# Patient Record
Sex: FEMALE | Race: WHITE | HISPANIC OR LATINO
[De-identification: names, ages, dates, MRNs, and addresses within clinical notes are randomized per-mention and may not be internally consistent; named-entity substitution may affect disease eponyms.]

---

## 2018-02-02 ENCOUNTER — LAB SERVICES (OUTPATIENT)
Dept: OTHER | Age: 23
End: 2018-02-02

## 2018-02-02 ENCOUNTER — IMAGING SERVICES (OUTPATIENT)
Dept: OTHER | Age: 23
End: 2018-02-02

## 2018-02-02 ENCOUNTER — CHARTING TRANS (OUTPATIENT)
Dept: OTHER | Age: 23
End: 2018-02-02

## 2018-02-02 LAB
B-HCG SERPL-ACNC: <2 M[IU]/ML (ref 0–6)
EST. AVERAGE GLUCOSE BLD GHB EST-MCNC: 103 MG/DL (ref 0–154)
ESTRADIOL SERPL-MCNC: 37.27 PG/ML
FOLLICLE STIMULATING: 4.02 M[IU]/ML (ref 2–25)
HBA1C MFR BLD: 5.2 % (ref 4.2–6)
LH SERPL-ACNC: 9.51 M[IU]/ML
PROGESTERONE: 2.23 NG/ML
PROLACTIN SERPL-MCNC: 13.5 NG/ML (ref 3–19)
TSH SERPL DL<=0.05 MIU/L-ACNC: 2.06 M[IU]/L (ref 0.3–4.82)

## 2018-02-05 LAB — DHEA-S SERPL-MCNC: 302.4 MCG/DL (ref 8–391)

## 2018-02-20 ENCOUNTER — LAB SERVICES (OUTPATIENT)
Dept: OTHER | Age: 23
End: 2018-02-20

## 2018-02-20 ENCOUNTER — CHARTING TRANS (OUTPATIENT)
Dept: OTHER | Age: 23
End: 2018-02-20

## 2018-02-20 LAB — PREGNANCY TEST, URINE: NEGATIVE

## 2018-02-21 LAB
C TRACH DNA SPEC QL NAA+PROBE: NEGATIVE
N GONORRHOEA DNA SPEC QL NAA+PROBE: POSITIVE

## 2018-03-09 ENCOUNTER — CHARTING TRANS (OUTPATIENT)
Dept: OTHER | Age: 23
End: 2018-03-09

## 2018-07-23 ENCOUNTER — LAB SERVICES (OUTPATIENT)
Dept: OTHER | Age: 23
End: 2018-07-23

## 2018-07-23 ENCOUNTER — CHARTING TRANS (OUTPATIENT)
Dept: OTHER | Age: 23
End: 2018-07-23

## 2018-07-23 LAB — PREGNANCY TEST, URINE: NEGATIVE

## 2018-07-24 LAB
C TRACH DNA SPEC QL NAA+PROBE: NEGATIVE
N GONORRHOEA DNA SPEC QL NAA+PROBE: NEGATIVE

## 2019-03-05 VITALS
DIASTOLIC BLOOD PRESSURE: 70 MMHG | SYSTOLIC BLOOD PRESSURE: 122 MMHG | WEIGHT: 197 LBS | BODY MASS INDEX: 31.66 KG/M2 | HEIGHT: 66 IN

## 2019-03-06 VITALS
SYSTOLIC BLOOD PRESSURE: 120 MMHG | HEIGHT: 66 IN | BODY MASS INDEX: 33.91 KG/M2 | DIASTOLIC BLOOD PRESSURE: 80 MMHG | WEIGHT: 211 LBS

## 2019-03-06 VITALS
DIASTOLIC BLOOD PRESSURE: 78 MMHG | BODY MASS INDEX: 33.59 KG/M2 | WEIGHT: 209 LBS | HEIGHT: 66 IN | SYSTOLIC BLOOD PRESSURE: 120 MMHG

## 2019-10-02 ENCOUNTER — APPOINTMENT (OUTPATIENT)
Dept: OBGYN | Age: 24
End: 2019-10-02

## 2019-10-03 ENCOUNTER — APPOINTMENT (OUTPATIENT)
Dept: OBGYN | Age: 24
End: 2019-10-03

## 2019-10-03 ENCOUNTER — OFFICE VISIT (OUTPATIENT)
Dept: OBGYN | Age: 24
End: 2019-10-03

## 2019-10-03 VITALS
HEIGHT: 66 IN | BODY MASS INDEX: 29.61 KG/M2 | WEIGHT: 184.25 LBS | DIASTOLIC BLOOD PRESSURE: 80 MMHG | SYSTOLIC BLOOD PRESSURE: 120 MMHG

## 2019-10-03 DIAGNOSIS — Z31.9 INFERTILITY MANAGEMENT: ICD-10-CM

## 2019-10-03 DIAGNOSIS — Z01.411 ENCOUNTER FOR GYNECOLOGICAL EXAMINATION WITH ABNORMAL FINDING: ICD-10-CM

## 2019-10-03 DIAGNOSIS — N89.8 VAGINAL DISCHARGE: ICD-10-CM

## 2019-10-03 DIAGNOSIS — Z11.3 ROUTINE SCREENING FOR STI (SEXUALLY TRANSMITTED INFECTION): ICD-10-CM

## 2019-10-03 DIAGNOSIS — R10.2 PELVIC CRAMPING: ICD-10-CM

## 2019-10-03 DIAGNOSIS — N92.6 IRREGULAR PERIODS: Primary | ICD-10-CM

## 2019-10-03 DIAGNOSIS — Z11.8 SCREENING FOR CHLAMYDIAL DISEASE: ICD-10-CM

## 2019-10-03 LAB
25(OH)D3 SERPL-MCNC: ABNORMAL NG/ML
B-HCG UR QL: NEGATIVE
CLUE CELLS: PRESENT
TRICHOMONAS ANTIGEN: POSITIVE
TRICHOMONAS: ABNORMAL
WP WBC: ABNORMAL

## 2019-10-03 PROCEDURE — 87491 CHLMYD TRACH DNA AMP PROBE: CPT | Performed by: OBSTETRICS & GYNECOLOGY

## 2019-10-03 PROCEDURE — 87591 N.GONORRHOEAE DNA AMP PROB: CPT | Performed by: OBSTETRICS & GYNECOLOGY

## 2019-10-03 PROCEDURE — 87210 SMEAR WET MOUNT SALINE/INK: CPT | Performed by: PATHOLOGY

## 2019-10-03 PROCEDURE — 88142 CYTOPATH C/V THIN LAYER: CPT | Performed by: PATHOLOGY

## 2019-10-03 PROCEDURE — 99395 PREV VISIT EST AGE 18-39: CPT | Performed by: OBSTETRICS & GYNECOLOGY

## 2019-10-03 PROCEDURE — 81025 URINE PREGNANCY TEST: CPT | Performed by: OBSTETRICS & GYNECOLOGY

## 2019-10-03 PROCEDURE — 99214 OFFICE O/P EST MOD 30 MIN: CPT | Performed by: OBSTETRICS & GYNECOLOGY

## 2019-10-04 LAB
C TRACH DNA SPEC QL NAA+PROBE: NEGATIVE
N GONORRHOEA DNA SPEC QL NAA+PROBE: NEGATIVE

## 2019-10-08 LAB — AP REPORT: NORMAL

## 2019-10-23 ENCOUNTER — TELEPHONE (OUTPATIENT)
Dept: OBGYN | Age: 24
End: 2019-10-23

## 2019-10-23 RX ORDER — METRONIDAZOLE 7.5 MG/G
1 GEL VAGINAL NIGHTLY
Qty: 70 G | Refills: 0 | Status: SHIPPED | OUTPATIENT
Start: 2019-10-23 | End: 2023-01-23

## 2019-10-23 RX ORDER — METRONIDAZOLE 500 MG/1
TABLET ORAL
Qty: 4 TABLET | Refills: 0 | Status: SHIPPED | OUTPATIENT
Start: 2019-10-23 | End: 2023-01-23

## 2019-11-01 ENCOUNTER — APPOINTMENT (OUTPATIENT)
Dept: ULTRASOUND IMAGING | Age: 24
End: 2019-11-01
Attending: OBSTETRICS & GYNECOLOGY

## 2023-01-23 ENCOUNTER — OFFICE VISIT (OUTPATIENT)
Dept: INTERNAL MEDICINE | Age: 28
End: 2023-01-23

## 2023-01-23 ENCOUNTER — LAB SERVICES (OUTPATIENT)
Dept: LAB | Age: 28
End: 2023-01-23

## 2023-01-23 VITALS
HEIGHT: 66 IN | HEART RATE: 102 BPM | RESPIRATION RATE: 18 BRPM | DIASTOLIC BLOOD PRESSURE: 68 MMHG | WEIGHT: 200 LBS | BODY MASS INDEX: 32.14 KG/M2 | OXYGEN SATURATION: 98 % | SYSTOLIC BLOOD PRESSURE: 110 MMHG | TEMPERATURE: 97.8 F

## 2023-01-23 DIAGNOSIS — Z00.00 ANNUAL PHYSICAL EXAM: Primary | ICD-10-CM

## 2023-01-23 DIAGNOSIS — M54.50 LUMBAR PAIN: ICD-10-CM

## 2023-01-23 DIAGNOSIS — N92.6 IRREGULAR PERIODS: ICD-10-CM

## 2023-01-23 PROBLEM — G89.29 CHRONIC BILATERAL LOW BACK PAIN WITHOUT SCIATICA: Status: ACTIVE | Noted: 2023-01-23

## 2023-01-23 PROBLEM — G89.29 CHRONIC BILATERAL LOW BACK PAIN WITHOUT SCIATICA: Status: RESOLVED | Noted: 2023-01-23 | Resolved: 2023-01-23

## 2023-01-23 LAB — B-HCG UR QL: NEGATIVE

## 2023-01-23 PROCEDURE — 99203 OFFICE O/P NEW LOW 30 MIN: CPT | Performed by: INTERNAL MEDICINE

## 2023-01-23 PROCEDURE — 81025 URINE PREGNANCY TEST: CPT | Performed by: INTERNAL MEDICINE

## 2023-01-23 PROCEDURE — 99385 PREV VISIT NEW AGE 18-39: CPT | Performed by: INTERNAL MEDICINE

## 2023-01-23 ASSESSMENT — PAIN SCALES - GENERAL: PAINLEVEL: 0

## 2023-01-23 ASSESSMENT — PATIENT HEALTH QUESTIONNAIRE - PHQ9
SUM OF ALL RESPONSES TO PHQ9 QUESTIONS 1 AND 2: 0
SUM OF ALL RESPONSES TO PHQ9 QUESTIONS 1 AND 2: 0
2. FEELING DOWN, DEPRESSED OR HOPELESS: NOT AT ALL
CLINICAL INTERPRETATION OF PHQ2 SCORE: NO FURTHER SCREENING NEEDED
1. LITTLE INTEREST OR PLEASURE IN DOING THINGS: NOT AT ALL

## 2023-02-04 ENCOUNTER — OFFICE VISIT (OUTPATIENT)
Dept: OBGYN | Age: 28
End: 2023-02-04
Attending: INTERNAL MEDICINE

## 2023-02-04 VITALS
OXYGEN SATURATION: 98 % | WEIGHT: 203.7 LBS | BODY MASS INDEX: 32.88 KG/M2 | HEART RATE: 73 BPM | RESPIRATION RATE: 18 BRPM | DIASTOLIC BLOOD PRESSURE: 66 MMHG | SYSTOLIC BLOOD PRESSURE: 104 MMHG | TEMPERATURE: 97.5 F

## 2023-02-04 DIAGNOSIS — N92.6 IRREGULAR MENSTRUAL CYCLE: Primary | ICD-10-CM

## 2023-02-04 DIAGNOSIS — Z31.69 ENCOUNTER FOR PRECONCEPTION CONSULTATION: ICD-10-CM

## 2023-02-04 PROCEDURE — 99203 OFFICE O/P NEW LOW 30 MIN: CPT | Performed by: NURSE PRACTITIONER

## 2023-08-21 ENCOUNTER — LAB SERVICES (OUTPATIENT)
Dept: LAB | Age: 28
End: 2023-08-21

## 2023-08-21 ENCOUNTER — TELEPHONE (OUTPATIENT)
Dept: OBGYN | Age: 28
End: 2023-08-21

## 2023-08-21 DIAGNOSIS — O20.9 BLEEDING IN EARLY PREGNANCY: ICD-10-CM

## 2023-08-21 DIAGNOSIS — O20.9 BLEEDING IN EARLY PREGNANCY: Primary | ICD-10-CM

## 2023-08-21 LAB — HCG SERPL-ACNC: 369 MUNITS/ML

## 2023-08-21 PROCEDURE — 36415 COLL VENOUS BLD VENIPUNCTURE: CPT | Performed by: NURSE PRACTITIONER

## 2023-08-21 PROCEDURE — 86900 BLOOD TYPING SEROLOGIC ABO: CPT | Performed by: INTERNAL MEDICINE

## 2023-08-21 PROCEDURE — 86901 BLOOD TYPING SEROLOGIC RH(D): CPT | Performed by: INTERNAL MEDICINE

## 2023-08-21 PROCEDURE — 84702 CHORIONIC GONADOTROPIN TEST: CPT | Performed by: INTERNAL MEDICINE

## 2023-08-22 DIAGNOSIS — O20.9 BLEEDING IN EARLY PREGNANCY: Primary | ICD-10-CM

## 2023-08-22 LAB — ABO + RH BLD: NORMAL

## 2023-08-23 ENCOUNTER — LAB SERVICES (OUTPATIENT)
Dept: LAB | Age: 28
End: 2023-08-23

## 2023-08-23 DIAGNOSIS — O20.9 BLEEDING IN EARLY PREGNANCY: ICD-10-CM

## 2023-08-23 LAB — HCG SERPL-ACNC: 297 MUNITS/ML

## 2023-08-23 PROCEDURE — 84702 CHORIONIC GONADOTROPIN TEST: CPT | Performed by: INTERNAL MEDICINE

## 2023-08-23 PROCEDURE — 36415 COLL VENOUS BLD VENIPUNCTURE: CPT | Performed by: NURSE PRACTITIONER

## 2023-08-25 ENCOUNTER — LAB SERVICES (OUTPATIENT)
Dept: LAB | Age: 28
End: 2023-08-25

## 2023-08-25 DIAGNOSIS — O20.9 BLEEDING IN EARLY PREGNANCY: ICD-10-CM

## 2023-08-25 LAB — HCG SERPL-ACNC: 355 MUNITS/ML

## 2023-08-25 PROCEDURE — 36415 COLL VENOUS BLD VENIPUNCTURE: CPT | Performed by: NURSE PRACTITIONER

## 2023-08-25 PROCEDURE — 84702 CHORIONIC GONADOTROPIN TEST: CPT | Performed by: INTERNAL MEDICINE

## 2023-08-28 DIAGNOSIS — O02.81 INAPPROPRIATE CHANGE IN QUANTITATIVE HCG IN EARLY PREGNANCY: Primary | ICD-10-CM

## 2023-08-29 ENCOUNTER — TELEPHONE (OUTPATIENT)
Dept: OBGYN | Age: 28
End: 2023-08-29

## 2023-08-30 ENCOUNTER — HOSPITAL ENCOUNTER (EMERGENCY)
Age: 28
Discharge: HOME | End: 2023-08-30
Attending: EMERGENCY MEDICINE

## 2023-08-30 ENCOUNTER — APPOINTMENT (OUTPATIENT)
Dept: OBGYN | Age: 28
End: 2023-08-30

## 2023-08-30 ENCOUNTER — IMAGING SERVICES (OUTPATIENT)
Dept: ULTRASOUND IMAGING | Age: 28
End: 2023-08-30
Attending: NURSE PRACTITIONER

## 2023-08-30 ENCOUNTER — OFFICE VISIT (OUTPATIENT)
Dept: OBGYN | Age: 28
End: 2023-08-30

## 2023-08-30 ENCOUNTER — LAB SERVICES (OUTPATIENT)
Dept: LAB | Age: 28
End: 2023-08-30

## 2023-08-30 ENCOUNTER — APPOINTMENT (OUTPATIENT)
Dept: ULTRASOUND IMAGING | Age: 28
End: 2023-08-30
Attending: EMERGENCY MEDICINE

## 2023-08-30 VITALS
BODY MASS INDEX: 31.85 KG/M2 | TEMPERATURE: 97.6 F | RESPIRATION RATE: 20 BRPM | HEART RATE: 68 BPM | WEIGHT: 198.2 LBS | SYSTOLIC BLOOD PRESSURE: 110 MMHG | HEIGHT: 66 IN | DIASTOLIC BLOOD PRESSURE: 62 MMHG

## 2023-08-30 VITALS
TEMPERATURE: 98.1 F | RESPIRATION RATE: 14 BRPM | HEIGHT: 66 IN | BODY MASS INDEX: 31.85 KG/M2 | HEART RATE: 73 BPM | WEIGHT: 198.19 LBS | OXYGEN SATURATION: 98 % | SYSTOLIC BLOOD PRESSURE: 113 MMHG | DIASTOLIC BLOOD PRESSURE: 75 MMHG

## 2023-08-30 DIAGNOSIS — O02.81 INAPPROPRIATE CHANGE IN QUANTITATIVE HCG IN EARLY PREGNANCY: ICD-10-CM

## 2023-08-30 DIAGNOSIS — N94.89 ADNEXAL MASS: Primary | ICD-10-CM

## 2023-08-30 DIAGNOSIS — O00.201 RIGHT OVARIAN PREGNANCY WITHOUT INTRAUTERINE PREGNANCY: Primary | ICD-10-CM

## 2023-08-30 LAB
ABO + RH BLD: NORMAL
ALBUMIN SERPL-MCNC: 3.7 G/DL (ref 3.6–5.1)
ALBUMIN/GLOB SERPL: 1.1 {RATIO} (ref 1–2.4)
ALP SERPL-CCNC: 77 UNITS/L (ref 45–117)
ALT SERPL-CCNC: 36 UNITS/L
ANION GAP SERPL CALC-SCNC: 10 MMOL/L (ref 7–19)
AST SERPL-CCNC: 27 UNITS/L
BASOPHILS # BLD: 0.1 K/MCL (ref 0–0.3)
BASOPHILS NFR BLD: 1 %
BILIRUB SERPL-MCNC: 0.6 MG/DL (ref 0.2–1)
BLD GP AB SCN SERPL QL GEL: NEGATIVE
BUN SERPL-MCNC: 9 MG/DL (ref 6–20)
BUN/CREAT SERPL: 15 (ref 7–25)
CALCIUM SERPL-MCNC: 8.4 MG/DL (ref 8.4–10.2)
CHLORIDE SERPL-SCNC: 109 MMOL/L (ref 97–110)
CO2 SERPL-SCNC: 25 MMOL/L (ref 21–32)
CREAT SERPL-MCNC: 0.61 MG/DL (ref 0.51–0.95)
DEPRECATED RDW RBC: 43.4 FL (ref 39–50)
EGFRCR SERPLBLD CKD-EPI 2021: >90 ML/MIN/{1.73_M2}
EOSINOPHIL # BLD: 0.1 K/MCL (ref 0–0.5)
EOSINOPHIL NFR BLD: 0 %
ERYTHROCYTE [DISTWIDTH] IN BLOOD: 12.6 % (ref 11–15)
FASTING DURATION TIME PATIENT: NORMAL H
GLOBULIN SER-MCNC: 3.5 G/DL (ref 2–4)
GLUCOSE SERPL-MCNC: 99 MG/DL (ref 70–99)
HCG SERPL-ACNC: 251 MUNITS/ML
HCG SERPL-ACNC: 269 MUNITS/ML
HCT VFR BLD CALC: 44.3 % (ref 36–46.5)
HGB BLD-MCNC: 14.8 G/DL (ref 12–15.5)
IMM GRANULOCYTES # BLD AUTO: 0.1 K/MCL (ref 0–0.2)
IMM GRANULOCYTES # BLD: 0 %
LYMPHOCYTES # BLD: 3 K/MCL (ref 1–4.8)
LYMPHOCYTES NFR BLD: 26 %
MCH RBC QN AUTO: 31.6 PG (ref 26–34)
MCHC RBC AUTO-ENTMCNC: 33.4 G/DL (ref 32–36.5)
MCV RBC AUTO: 94.5 FL (ref 78–100)
MONOCYTES # BLD: 0.8 K/MCL (ref 0.3–0.9)
MONOCYTES NFR BLD: 7 %
NEUTROPHILS # BLD: 7.6 K/MCL (ref 1.8–7.7)
NEUTROPHILS NFR BLD: 66 %
NRBC BLD MANUAL-RTO: 0 /100 WBC
PLATELET # BLD AUTO: 220 K/MCL (ref 140–450)
POTASSIUM SERPL-SCNC: 4 MMOL/L (ref 3.4–5.1)
PROT SERPL-MCNC: 7.2 G/DL (ref 6.4–8.2)
RAINBOW EXTRA TUBES HOLD SPECIMEN: NORMAL
RAINBOW EXTRA TUBES HOLD SPECIMEN: NORMAL
RBC # BLD: 4.69 MIL/MCL (ref 4–5.2)
SODIUM SERPL-SCNC: 140 MMOL/L (ref 135–145)
TYPE AND SCREEN EXPIRATION DATE: NORMAL
WBC # BLD: 11.6 K/MCL (ref 4.2–11)

## 2023-08-30 PROCEDURE — 36415 COLL VENOUS BLD VENIPUNCTURE: CPT | Performed by: NURSE PRACTITIONER

## 2023-08-30 PROCEDURE — 84702 CHORIONIC GONADOTROPIN TEST: CPT | Performed by: INTERNAL MEDICINE

## 2023-08-30 PROCEDURE — 76801 OB US < 14 WKS SINGLE FETUS: CPT

## 2023-08-30 PROCEDURE — 99284 EMERGENCY DEPT VISIT MOD MDM: CPT

## 2023-08-30 PROCEDURE — 99213 OFFICE O/P EST LOW 20 MIN: CPT | Performed by: PHYSICIAN ASSISTANT

## 2023-08-30 PROCEDURE — 76801 OB US < 14 WKS SINGLE FETUS: CPT | Performed by: RADIOLOGY

## 2023-08-30 PROCEDURE — 86901 BLOOD TYPING SEROLOGIC RH(D): CPT | Performed by: EMERGENCY MEDICINE

## 2023-08-30 PROCEDURE — 85025 COMPLETE CBC W/AUTO DIFF WBC: CPT | Performed by: EMERGENCY MEDICINE

## 2023-08-30 PROCEDURE — 84702 CHORIONIC GONADOTROPIN TEST: CPT | Performed by: EMERGENCY MEDICINE

## 2023-08-30 PROCEDURE — 36415 COLL VENOUS BLD VENIPUNCTURE: CPT

## 2023-08-30 PROCEDURE — 76817 TRANSVAGINAL US OBSTETRIC: CPT | Performed by: RADIOLOGY

## 2023-08-30 PROCEDURE — 10002800 HB RX 250 W HCPCS: Performed by: EMERGENCY MEDICINE

## 2023-08-30 PROCEDURE — 96401 CHEMO ANTI-NEOPL SQ/IM: CPT | Performed by: EMERGENCY MEDICINE

## 2023-08-30 PROCEDURE — 80053 COMPREHEN METABOLIC PANEL: CPT | Performed by: EMERGENCY MEDICINE

## 2023-08-30 RX ORDER — METHOTREXATE 25 MG/ML
50 INJECTION INTRA-ARTERIAL; INTRAMUSCULAR; INTRATHECAL; INTRAVENOUS ONCE
Status: COMPLETED | OUTPATIENT
Start: 2023-08-30 | End: 2023-08-30

## 2023-08-30 RX ADMIN — METHOTREXATE 99.5 MG: 25 SOLUTION INTRA-ARTERIAL; INTRAMUSCULAR; INTRATHECAL; INTRAVENOUS at 16:16

## 2023-08-30 ASSESSMENT — PAIN SCALES - GENERAL: PAINLEVEL_OUTOF10: 2

## 2023-08-31 ENCOUNTER — TELEPHONE (OUTPATIENT)
Dept: OBGYN | Age: 28
End: 2023-08-31

## 2023-08-31 DIAGNOSIS — O00.201 RIGHT OVARIAN PREGNANCY WITHOUT INTRAUTERINE PREGNANCY: Primary | ICD-10-CM

## 2023-09-01 ENCOUNTER — E-ADVICE (OUTPATIENT)
Dept: OTHER | Age: 28
End: 2023-09-01

## 2023-09-01 ENCOUNTER — OFFICE VISIT (OUTPATIENT)
Dept: OBGYN | Age: 28
End: 2023-09-01

## 2023-09-01 VITALS — TEMPERATURE: 97.8 F | DIASTOLIC BLOOD PRESSURE: 84 MMHG | SYSTOLIC BLOOD PRESSURE: 114 MMHG

## 2023-09-01 DIAGNOSIS — O00.90 ECTOPIC PREGNANCY WITHOUT INTRAUTERINE PREGNANCY, UNSPECIFIED LOCATION: Primary | ICD-10-CM

## 2023-09-01 PROCEDURE — 99214 OFFICE O/P EST MOD 30 MIN: CPT | Performed by: STUDENT IN AN ORGANIZED HEALTH CARE EDUCATION/TRAINING PROGRAM

## 2023-09-02 ENCOUNTER — E-ADVICE (OUTPATIENT)
Dept: OTHER | Age: 28
End: 2023-09-02

## 2023-09-05 ENCOUNTER — TELEPHONE (OUTPATIENT)
Dept: OBGYN | Age: 28
End: 2023-09-05

## 2023-09-05 ENCOUNTER — LAB SERVICES (OUTPATIENT)
Dept: LAB | Age: 28
End: 2023-09-05

## 2023-09-05 DIAGNOSIS — O00.90 ECTOPIC PREGNANCY WITHOUT INTRAUTERINE PREGNANCY, UNSPECIFIED LOCATION: Primary | ICD-10-CM

## 2023-09-05 DIAGNOSIS — O00.201 RIGHT OVARIAN PREGNANCY WITHOUT INTRAUTERINE PREGNANCY: ICD-10-CM

## 2023-09-05 LAB
ALBUMIN SERPL-MCNC: 3.6 G/DL (ref 3.6–5.1)
ALBUMIN/GLOB SERPL: 1.1 {RATIO} (ref 1–2.4)
ALP SERPL-CCNC: 81 UNITS/L (ref 45–117)
ALT SERPL-CCNC: 40 UNITS/L
ANION GAP SERPL CALC-SCNC: 14 MMOL/L (ref 7–19)
AST SERPL-CCNC: 17 UNITS/L
BASOPHILS # BLD: 0.1 K/MCL (ref 0–0.3)
BASOPHILS NFR BLD: 1 %
BILIRUB SERPL-MCNC: 0.6 MG/DL (ref 0.2–1)
BUN SERPL-MCNC: 8 MG/DL (ref 6–20)
BUN/CREAT SERPL: 10 (ref 7–25)
CALCIUM SERPL-MCNC: 8.5 MG/DL (ref 8.4–10.2)
CHLORIDE SERPL-SCNC: 102 MMOL/L (ref 97–110)
CO2 SERPL-SCNC: 28 MMOL/L (ref 21–32)
CREAT SERPL-MCNC: 0.79 MG/DL (ref 0.51–0.95)
DEPRECATED RDW RBC: 42.5 FL (ref 39–50)
EGFRCR SERPLBLD CKD-EPI 2021: >90 ML/MIN/{1.73_M2}
EOSINOPHIL # BLD: 0.1 K/MCL (ref 0–0.5)
EOSINOPHIL NFR BLD: 2 %
ERYTHROCYTE [DISTWIDTH] IN BLOOD: 12.3 % (ref 11–15)
FASTING DURATION TIME PATIENT: NORMAL H
GLOBULIN SER-MCNC: 3.3 G/DL (ref 2–4)
GLUCOSE SERPL-MCNC: 88 MG/DL (ref 70–99)
HCG SERPL-ACNC: 265 MUNITS/ML
HCT VFR BLD CALC: 44.7 % (ref 36–46.5)
HGB BLD-MCNC: 14.7 G/DL (ref 12–15.5)
IMM GRANULOCYTES # BLD AUTO: 0 K/MCL (ref 0–0.2)
IMM GRANULOCYTES # BLD: 0 %
LYMPHOCYTES # BLD: 3 K/MCL (ref 1–4.8)
LYMPHOCYTES NFR BLD: 38 %
MCH RBC QN AUTO: 31.1 PG (ref 26–34)
MCHC RBC AUTO-ENTMCNC: 32.9 G/DL (ref 32–36.5)
MCV RBC AUTO: 94.7 FL (ref 78–100)
MONOCYTES # BLD: 0.6 K/MCL (ref 0.3–0.9)
MONOCYTES NFR BLD: 8 %
NEUTROPHILS # BLD: 4 K/MCL (ref 1.8–7.7)
NEUTROPHILS NFR BLD: 51 %
NRBC BLD MANUAL-RTO: 0 /100 WBC
PLATELET # BLD AUTO: 237 K/MCL (ref 140–450)
POTASSIUM SERPL-SCNC: 4.1 MMOL/L (ref 3.4–5.1)
PROT SERPL-MCNC: 6.9 G/DL (ref 6.4–8.2)
RBC # BLD: 4.72 MIL/MCL (ref 4–5.2)
SODIUM SERPL-SCNC: 140 MMOL/L (ref 135–145)
WBC # BLD: 7.8 K/MCL (ref 4.2–11)

## 2023-09-05 PROCEDURE — 36415 COLL VENOUS BLD VENIPUNCTURE: CPT | Performed by: OBSTETRICS & GYNECOLOGY

## 2023-09-05 PROCEDURE — 85025 COMPLETE CBC W/AUTO DIFF WBC: CPT | Performed by: INTERNAL MEDICINE

## 2023-09-05 PROCEDURE — 84702 CHORIONIC GONADOTROPIN TEST: CPT | Performed by: INTERNAL MEDICINE

## 2023-09-05 PROCEDURE — 80053 COMPREHEN METABOLIC PANEL: CPT | Performed by: INTERNAL MEDICINE

## 2023-09-06 ENCOUNTER — TELEPHONE (OUTPATIENT)
Dept: OBGYN | Age: 28
End: 2023-09-06

## 2023-09-06 ENCOUNTER — CLINICAL ABSTRACT (OUTPATIENT)
Dept: INFUSION THERAPY | Age: 28
End: 2023-09-06

## 2023-09-06 ENCOUNTER — CLINICAL DOCUMENTATION (OUTPATIENT)
Dept: OBGYN | Age: 28
End: 2023-09-06

## 2023-09-06 ENCOUNTER — LAB SERVICES (OUTPATIENT)
Dept: LAB | Age: 28
End: 2023-09-06
Attending: INTERNAL MEDICINE

## 2023-09-06 ENCOUNTER — HOSPITAL ENCOUNTER (OUTPATIENT)
Dept: INFUSION THERAPY | Age: 28
Discharge: STILL ADMITTED OR ADMINISTRATIVE | End: 2023-09-06

## 2023-09-06 VITALS
DIASTOLIC BLOOD PRESSURE: 70 MMHG | BODY MASS INDEX: 31.02 KG/M2 | WEIGHT: 197.64 LBS | RESPIRATION RATE: 16 BRPM | SYSTOLIC BLOOD PRESSURE: 109 MMHG | TEMPERATURE: 98.6 F | OXYGEN SATURATION: 98 % | HEART RATE: 70 BPM | HEIGHT: 67 IN

## 2023-09-06 DIAGNOSIS — Z32.00 ENCOUNTER FOR ASSESSMENT FOR SUSPECTED ECTOPIC PREGNANCY: ICD-10-CM

## 2023-09-06 DIAGNOSIS — O00.90 ECTOPIC PREGNANCY WITHOUT INTRAUTERINE PREGNANCY, UNSPECIFIED LOCATION: Primary | ICD-10-CM

## 2023-09-06 DIAGNOSIS — Z32.00 ENCOUNTER FOR ASSESSMENT FOR SUSPECTED ECTOPIC PREGNANCY: Primary | ICD-10-CM

## 2023-09-06 LAB — HCG SERPL-ACNC: 186 MUNITS/ML

## 2023-09-06 PROCEDURE — 96401 CHEMO ANTI-NEOPL SQ/IM: CPT | Performed by: LEGAL MEDICINE

## 2023-09-06 PROCEDURE — 84702 CHORIONIC GONADOTROPIN TEST: CPT

## 2023-09-06 PROCEDURE — 36415 COLL VENOUS BLD VENIPUNCTURE: CPT

## 2023-09-06 PROCEDURE — 10002800 HB RX 250 W HCPCS: Performed by: LEGAL MEDICINE

## 2023-09-06 RX ORDER — METHOTREXATE 25 MG/ML
50 INJECTION INTRA-ARTERIAL; INTRAMUSCULAR; INTRATHECAL; INTRAVENOUS ONCE
Status: CANCELLED | OUTPATIENT
Start: 2023-09-06 | End: 2023-09-06

## 2023-09-06 RX ORDER — METHOTREXATE 25 MG/ML
50 INJECTION INTRA-ARTERIAL; INTRAMUSCULAR; INTRATHECAL; INTRAVENOUS ONCE
Status: COMPLETED | OUTPATIENT
Start: 2023-09-06 | End: 2023-09-06

## 2023-09-06 RX ADMIN — METHOTREXATE 99.5 MG: 25 SOLUTION INTRA-ARTERIAL; INTRAMUSCULAR; INTRATHECAL; INTRAVENOUS at 14:42

## 2023-09-13 ENCOUNTER — LAB SERVICES (OUTPATIENT)
Dept: LAB | Age: 28
End: 2023-09-13

## 2023-09-13 DIAGNOSIS — O00.90 ECTOPIC PREGNANCY WITHOUT INTRAUTERINE PREGNANCY, UNSPECIFIED LOCATION (CMD): ICD-10-CM

## 2023-09-13 LAB — HCG SERPL-ACNC: 64 MUNITS/ML

## 2023-09-13 PROCEDURE — 36415 COLL VENOUS BLD VENIPUNCTURE: CPT | Performed by: OBSTETRICS & GYNECOLOGY

## 2023-09-13 PROCEDURE — 84702 CHORIONIC GONADOTROPIN TEST: CPT | Performed by: INTERNAL MEDICINE

## 2023-09-14 ENCOUNTER — OFFICE VISIT (OUTPATIENT)
Dept: OBGYN | Age: 28
End: 2023-09-14

## 2023-09-14 VITALS
DIASTOLIC BLOOD PRESSURE: 74 MMHG | HEART RATE: 68 BPM | HEIGHT: 66 IN | RESPIRATION RATE: 12 BRPM | WEIGHT: 198.5 LBS | SYSTOLIC BLOOD PRESSURE: 110 MMHG | BODY MASS INDEX: 31.9 KG/M2

## 2023-09-14 DIAGNOSIS — O00.90 ECTOPIC PREGNANCY WITHOUT INTRAUTERINE PREGNANCY, UNSPECIFIED LOCATION: Primary | ICD-10-CM

## 2023-09-14 DIAGNOSIS — O00.109 TUBAL PREGNANCY WITHOUT INTRAUTERINE PREGNANCY, UNSPECIFIED LATERALITY: Primary | ICD-10-CM

## 2023-09-14 PROCEDURE — 99213 OFFICE O/P EST LOW 20 MIN: CPT | Performed by: STUDENT IN AN ORGANIZED HEALTH CARE EDUCATION/TRAINING PROGRAM

## 2023-09-18 ENCOUNTER — TELEPHONE (OUTPATIENT)
Dept: OBGYN | Age: 28
End: 2023-09-18

## 2023-09-20 ENCOUNTER — LAB SERVICES (OUTPATIENT)
Dept: LAB | Age: 28
End: 2023-09-20

## 2023-09-20 DIAGNOSIS — O00.109 TUBAL PREGNANCY WITHOUT INTRAUTERINE PREGNANCY, UNSPECIFIED LATERALITY: ICD-10-CM

## 2023-09-20 LAB — HCG SERPL-ACNC: 19 MUNITS/ML

## 2023-09-20 PROCEDURE — 84702 CHORIONIC GONADOTROPIN TEST: CPT | Performed by: INTERNAL MEDICINE

## 2023-09-20 PROCEDURE — 36415 COLL VENOUS BLD VENIPUNCTURE: CPT | Performed by: OBSTETRICS & GYNECOLOGY

## 2023-09-22 ENCOUNTER — APPOINTMENT (OUTPATIENT)
Dept: OBGYN | Age: 28
End: 2023-09-22

## 2023-10-02 ENCOUNTER — LAB SERVICES (OUTPATIENT)
Dept: LAB | Age: 28
End: 2023-10-02

## 2023-10-02 ENCOUNTER — TELEPHONE (OUTPATIENT)
Dept: OBGYN | Age: 28
End: 2023-10-02

## 2023-10-02 DIAGNOSIS — O00.90 ECTOPIC PREGNANCY WITHOUT INTRAUTERINE PREGNANCY, UNSPECIFIED LOCATION: Primary | ICD-10-CM

## 2023-10-02 DIAGNOSIS — O00.109 TUBAL PREGNANCY WITHOUT INTRAUTERINE PREGNANCY, UNSPECIFIED LATERALITY: ICD-10-CM

## 2023-10-02 LAB — HCG SERPL-ACNC: 3 MUNITS/ML

## 2023-10-02 PROCEDURE — 84702 CHORIONIC GONADOTROPIN TEST: CPT | Performed by: INTERNAL MEDICINE

## 2023-10-02 PROCEDURE — 36415 COLL VENOUS BLD VENIPUNCTURE: CPT | Performed by: INTERNAL MEDICINE

## 2023-10-02 PROCEDURE — 36415 COLL VENOUS BLD VENIPUNCTURE: CPT | Performed by: OBSTETRICS & GYNECOLOGY

## 2023-10-24 ENCOUNTER — E-ADVICE (OUTPATIENT)
Dept: OTHER | Age: 28
End: 2023-10-24

## 2023-10-24 ENCOUNTER — TELEPHONE (OUTPATIENT)
Dept: OBGYN | Age: 28
End: 2023-10-24

## 2023-11-03 ENCOUNTER — APPOINTMENT (OUTPATIENT)
Dept: OBGYN | Age: 28
End: 2023-11-03

## 2024-04-17 LAB — CYTOLOGY CVX/VAG DOC THIN PREP: NORMAL

## 2024-04-29 ENCOUNTER — CLINICAL ABSTRACT (OUTPATIENT)
Dept: INTERNAL MEDICINE | Age: 29
End: 2024-04-29

## 2024-06-05 LAB
ANTIBODY SCREEN OB: NEGATIVE
HEPATITIS B SURFACE ANTIGEN OB: NEGATIVE
RH FACTOR OB: POSITIVE
SYPHILIS-TOTAL QUAL: NONREACTIVE

## 2024-07-10 ENCOUNTER — OFFICE VISIT (OUTPATIENT)
Dept: PERINATAL CARE | Facility: HOSPITAL | Age: 29
End: 2024-07-10
Attending: OBSTETRICS & GYNECOLOGY
Payer: COMMERCIAL

## 2024-07-10 VITALS
WEIGHT: 197 LBS | HEIGHT: 66 IN | BODY MASS INDEX: 31.66 KG/M2 | HEART RATE: 106 BPM | SYSTOLIC BLOOD PRESSURE: 132 MMHG | DIASTOLIC BLOOD PRESSURE: 82 MMHG

## 2024-07-10 DIAGNOSIS — E66.09 OTHER OBESITY DUE TO EXCESS CALORIES AFFECTING PREGNANCY IN SECOND TRIMESTER (HCC): Primary | ICD-10-CM

## 2024-07-10 DIAGNOSIS — O99.210 OBESITY AFFECTING PREGNANCY (HCC): ICD-10-CM

## 2024-07-10 DIAGNOSIS — O99.212 OTHER OBESITY DUE TO EXCESS CALORIES AFFECTING PREGNANCY IN SECOND TRIMESTER (HCC): Primary | ICD-10-CM

## 2024-07-10 PROCEDURE — 76811 OB US DETAILED SNGL FETUS: CPT | Performed by: OBSTETRICS & GYNECOLOGY

## 2024-07-10 NOTE — PROGRESS NOTES
Outpatient Maternal-Fetal Medicine Consultation    Dear Dr. Luu    Thank you for requesting ultrasound evaluation and maternal fetal medicine consultation on your patient Elyssa Saldivar.  As you are aware she is a 29 year old female  with a vo pregnancy and an Estimated Date of Delivery: 24.  A maternal-fetal medicine consultation was requested secondary to Obesity.  Her prenatal records and labs were reviewed.    No longer using marijuana.  Stopped at 10 weeks.  Only gained 3-4 pounds.  ROS    HISTORY  OB History    Para Term  AB Living   1             SAB IAB Ectopic Multiple Live Births                  # Outcome Date GA Lbr Mars/2nd Weight Sex Type Anes PTL Lv   1 Current                Allergies:  Not on File NKDA   Current Meds:  Current Outpatient Medications   Medication Sig Dispense Refill    PRENATAL MULTIVIT-MIN-FE-FA OR Take by mouth As Directed.          HISTORY:  No past medical history on file. History STI, obesity   No past surgical history on file.   No family history on file.   Social History     Socioeconomic History    Marital status: Single     Social Determinants of Health      Received from [facility]    Housing Stability          PHYSICAL EXAMINATION:  /82 (BP Location: Right arm, Patient Position: Sitting, Cuff Size: adult)   Pulse 106   Ht 5' 6\" (1.676 m)   Wt 197 lb (89.4 kg)   BMI 31.80 kg/m²   Physical Exam  Constitutional:       Appearance: Normal appearance.   Abdominal:      Palpations: Abdomen is soft.      Tenderness: There is no abdominal tenderness.   Neurological:      Mental Status: She is alert.   Psychiatric:         Mood and Affect: Mood normal.         Behavior: Behavior normal.         OBSTETRIC ULTRASOUND  The patient had a level II ultrasound today which revealed size consistent with dates and a normal detailed anatomic survey.  Ultrasound Findings:  Single IUP in breech presentation.    Placenta is  posterior.   A 3 vessel cord is noted.  Cardiac activity is present at 161 bpm   g ( 1 lb 0 oz);   MVP is 4.9 cm .     The fetal measurements are consistent with the established EDC. No ultrasound evidence of structural abnormalities are seen today. The nasal bone is present. No ultrasound evidence of markers for aneuploidy are seen. She understands that ultrasound exam cannot exclude genetic abnormalities and that genetic testing is recommended. The limitations of ultrasound were discussed.     Uterus and adnexa appeared normal  today on US  See PACS/Imaging Tab For Complete Ultrasound Report  I interpreted the results and reviewed them with the patient.    DISCUSSION  During her visit we discussed and reviewed the following issues:  OBESITY:  Her BMI prior to pregnancy was 31  Obesity during pregnancy is associated with numerous maternal and  risks.  It is not clear whether obesity is a direct cause of adverse pregnancy outcome or whether the association between obesity and adverse pregnancy outcome is due to factors such as diabetes mellitus.   Data suggest that obese women should be encouraged to undertake a weight reduction program (diet, exercise, behavior modification, and possibly bariatric surgery in some cases) prior to attempting to conceive.            Subfertility in obese women is most commonly related to ovulatory dysfunction, and, in some obese women, the ovulatory dysfunction is related to polycystic ovary syndrome (PCOS). It is also important to note that even among ovulatory women, increasing obesity is associated with decreasing spontaneous pregnancy rates.  The increased risk of miscarriage in obese women may be because such women often have PCOS or isolated insulin resistance.                 Due to its strong association with obesity in the general population, type 2 diabetes mellitus is one of the two most common medical complications of the obese . The increased risk  of type 2 diabetes is primarily related to an exaggerated increase in insulin resistance in the obese state. It is reasonable to screen obese gravidas for undiagnosed pregestational diabetes in the first trimester.   Glucose intolerance associated with gestational diabetes generally resolves postpartum; however, obese women with a history of gestational diabetes have a two-fold increased prevalence of subsequent type 2 diabetes.           An association between obesity and hypertensive disorders during pregnancy has been consistently reported.  In particular, maternal weight and BMI are independent risk factors for preeclampsia.             Studies have found that the increased risk of  birth in obese gravidas is primarily associated with obesity-related medical and  complications, rather than an intrinsic predisposition to spontaneous  birth. Prevention of  birth in these patients, therefore, should be directed toward prevention or management of medical and obstetrical complications.               Both prepregnancy obesity and excessive maternal weight gain before or during pregnancy contribute to an increased probability of  delivery.  It has also been hypothesized that obesity may lead to dystocia due to increased soft tissue deposition in the maternal pelvis.    delivery in the obese  is associated with numerous perioperative concerns, including emergency delivery, prolonged incision to delivery interval, blood loss >1000 mL, longer operative times, wound infection, thromboembolism, and endometritis.            Maternal obesity appears to be associated with a small increase in the absolute rate of some congenital anomalies (primarily neural tube defect and cardiac), and the risk may increase with increasing maternal weight.  Level II ultrasound is advised for women with obesity.  The risk of neural tube defects increased significantly with maternal weight.     The analysis found that overweight and obese pregnant women experienced significantly more stillbirths than normal weight women.  Third trimester  testing is advised.     Prevention of Preeclampsia    Antiplatelet Agents  The observation that preeclampsia is associated with increased platelet turnover and increased platelet-derived thromboxane levels led to randomized trials evaluating low-dose aspirin therapy in women thought to be at increased risk of the disease. As opposed to higher dose aspirin therapy, low-dose aspirin (60 to 150 mg per day) diminishes platelet thromboxane synthesis while maintaining vascular wall prostacyclin synthesis. Although not well studied, the beneficial effect of low-dose aspirin for prevention of preeclampsia may also be in part related to modulation of inflammation. The drug has been studied for both prevention of preeclampsia and prevention of progression from mild to severe disease. It appears to result in a modest reduction in risk of preeclampsia when given to women at moderate to high risk of preeclampsia.  This approach has been studied in over 35,000 women, for both prevention of preeclampsia and prevention of progression of the disease. Low dose aspirin has a modest impact on pregnancy outcome; it reduces the risk of preeclampsia, as well as other adverse pregnancy outcomes (eg,  delivery, fetal growth restriction) by about 10 to 20 percent. Low dose aspirin is safe in pregnancy; thus, it is a reasonable strategy in women with a moderate to high risk of preeclampsia in whom the benefits outweigh the risks.     Clinical Guidelines  Based on the available data, low-dose aspirin is advised for women at high risk for preeclampsia. There is no consensus on the criteria that confer high risk. The [place] Preventive Services Task Force (USPSTF) risk criteria are reasonable.  USPSTF criteria for high risk include one or more of the following:   Previous  pregnancy with preeclampsia, especially early onset and with an adverse outcome   Multifetal gestation  Chronic hypertension   Type 1 or 2 diabetes mellitus  Chronic kidney disease  Autoimmune disease (antiphospholipid syndrome, systemic lupus erythematosus)     Women with multiple moderate risk factors for preeclampsia are considered high risk, as well. Identification of women with an appropriate combination of moderate risk factors to be considered high risk is subjective and determined case by case, as the data describing the magnitude of the association between each of these risk factors and development of preeclampsia vary widely and lack consistency. USPSTF criteria for moderate risk include:  Nulliparity  Obesity (body mass index >30 kg/m2)  Family history of preeclampsia in mother or sister  Age ?35 years  Sociodemographic characteristics (, low socioeconomic level)  Personal risk factors (eg, history of low birth weight or small for gestational age, previous adverse pregnancy outcome, >10 year pregnancy interval)     If used, daily low-dose aspirin should be initiated in the 12th or 13th week of gestation, although adverse effects from earlier initiation (eg, preconception) have not been documented. The optimum low dose is unclear; 81 mg is readily available, but 100 or 150 mg (which are not available in some countries including the [place] may be more effective.  In some pregnant women, platelet function is not inhibited by the 81 mg dose but is altered by higher dosing. Hence, current evidence has suggested a daily dose of 100 to 150 mg of aspirin rather than a lower dose. In the [place], this can be achieved by taking one and one-half 81 mg tablets; however, taking one 81 mg tablet is also reasonable since this is the commercially available dose and has proven efficacy. For prevention of preeclampsia, no trials have evaluated the efficacy of a higher dose of aspirin, which could be  achieved easily by taking two 81 mg tablets or splitting a 325 mg tablet. For prevention of myocardial infarction and stroke in nonpregnant individuals, aspirin appears to be equally effective at doses between 75 and 325 mg per day.  The safety of low-dose aspirin use in the second and third trimesters is well established, but questions linger regarding use in the first trimester (possible increase in minor vaginal bleeding or gastroschisis). Early therapy (before 16 weeks) may be important since the pathophysiologic features of preeclampsia develop at this time, weeks before clinical disease is apparent. However, available evidence is inconsistent about the importance of early initiation of therapy, possibly because aspirin has major effects on prostacyclin production and endothelial function throughout gestation.  Aspirin is discontinued 5 to 10 days before expected delivery to diminish the risk of bleeding during delivery; however, no adverse maternal or fetal effects related to low-dose aspirin have been proven.  Major questions remain as to which, if any, subgroups of women are more likely to benefit from low-dose aspirin therapy, when treatment should be started (first or second trimester), and the optimum dose to inhibit placental PGH synthase (cyclooxygenase) and also allow the anti-inflammatory effects of low-dose aspirin.        Marijuana (cannabis) is the most common illicit substance used during pregnancy.  Self-reported rates of use vary from 2 to 5 percent in many studies to approximately 30 percent among young, urban, and socioeconomically disadvantaged women. Of women who use marijuana, approximately 50 percent continue use during pregnancy.  Chemical products from marijuana use are transferred across the placenta and into breast milk. In rat models, fetal plasma levels were approximately 10 percent of maternal levels after acute exposure to delta-9-tetrahydrocannabinol (THC), the primary  psychoactive cannabinoid. However, repetitive exposure of THC resulted in higher fetal levels.  While there is no high-quality evidence to suggest an increase in congenital anomalies among marijuana users.  Mixed results have also been reported regarding pregnancy outcomes such as low birth weight,  birth, and stillbirth in marijuana users.  It is unclear whether prenatal marijuana exposure affects neurodevelopmental outcome.  Studies suggest that prenatal marijuana exposure does not affect global intelligence, but may impair sustained attention, visual memory, and analysis and integration in exposed preadolescents and adolescents.    IMPRESSION:  IUP at 22w0d  Obesity    RECOMMENDATIONS:  Continue care with Dr. Luu  Growth US & BPP at 32 weeks  Weekly NSTs at 36 weeks  11-20 lb weight gain for pregnancy   Low dose aspirin 162 mg daily      Thank you for allowing me to participate in the care of your patient.  Please do not hesitate to contact me if additional questions or concerns arise.      Sanjuana Zapata M.D.    40 minutes spent in review of records, patient consultation, documentation and coordination of care.  The relevant clinical matter(s) are summarized above.     Note to patient and family  The 21st Century Cures Act makes medical notes available to patients in the interest of transparency.  However, please be advised that this is a medical document.  It is intended as [id number] communication.  It is written and medical language may contain abbreviations or verbiage that are technical and unfamiliar.  It may appear blunt or direct.  Medical documents are intended to carry relevant information, facts as evident, and the clinical opinion of the practitioner.

## 2024-08-21 LAB — HIV ANTIGEN-ANTIBODY QUAL: NONREACTIVE

## 2024-09-18 ENCOUNTER — ULTRASOUND ENCOUNTER (OUTPATIENT)
Dept: PERINATAL CARE | Facility: HOSPITAL | Age: 29
End: 2024-09-18
Attending: OBSTETRICS & GYNECOLOGY
Payer: COMMERCIAL

## 2024-09-18 VITALS
HEIGHT: 66 IN | SYSTOLIC BLOOD PRESSURE: 127 MMHG | DIASTOLIC BLOOD PRESSURE: 75 MMHG | BODY MASS INDEX: 33.11 KG/M2 | WEIGHT: 206 LBS | HEART RATE: 83 BPM

## 2024-09-18 DIAGNOSIS — E66.09 OTHER OBESITY DUE TO EXCESS CALORIES AFFECTING PREGNANCY IN THIRD TRIMESTER (HCC): Primary | ICD-10-CM

## 2024-09-18 DIAGNOSIS — O36.5930 POOR FETAL GROWTH AFFECTING MANAGEMENT OF MOTHER IN THIRD TRIMESTER, SINGLE OR UNSPECIFIED FETUS (HCC): ICD-10-CM

## 2024-09-18 DIAGNOSIS — O99.210 OBESITY AFFECTING PREGNANCY (HCC): ICD-10-CM

## 2024-09-18 DIAGNOSIS — O99.213 OTHER OBESITY DUE TO EXCESS CALORIES AFFECTING PREGNANCY IN THIRD TRIMESTER (HCC): Primary | ICD-10-CM

## 2024-09-18 PROCEDURE — 76820 UMBILICAL ARTERY ECHO: CPT

## 2024-09-18 PROCEDURE — 76819 FETAL BIOPHYS PROFIL W/O NST: CPT

## 2024-09-18 PROCEDURE — 76816 OB US FOLLOW-UP PER FETUS: CPT | Performed by: OBSTETRICS & GYNECOLOGY

## 2024-09-18 RX ORDER — ASPIRIN 81 MG/1
81 TABLET ORAL DAILY
COMMUNITY

## 2024-09-18 NOTE — PROGRESS NOTES
Outpatient Maternal-Fetal Medicine Consultation    Dear Dr. Luu    Thank you for requesting ultrasound evaluation and maternal fetal medicine consultation on your patient Elyssa Saldivar.  As you are aware she is a 29 year old female  with a vo pregnancy and an Estimated Date of Delivery: 24.  A maternal-fetal medicine f/u is today.   Her prenatal records and labs were reviewed.    No new changes.  ROS    HISTORY  OB History    Para Term  AB Living   1             SAB IAB Ectopic Multiple Live Births                  # Outcome Date GA Lbr Mars/2nd Weight Sex Type Anes PTL Lv   1 Current                Allergies:  Not on File NKDA   Current Meds:  Current Outpatient Medications   Medication Sig Dispense Refill    aspirin 81 MG Oral Tab EC Take 1 tablet (81 mg total) by mouth daily.      PRENATAL MULTIVIT-MIN-FE-FA OR Take by mouth As Directed.          HISTORY:  No past medical history on file. History STI, obesity   No past surgical history on file.   No family history on file.   Social History     Socioeconomic History    Marital status: Single     Social Determinants of Health      Received from [facility]    Housing Stability          PHYSICAL EXAMINATION:  /75 (BP Location: Right arm, Patient Position: Sitting, Cuff Size: adult)   Pulse 83   Ht 5' 6\" (1.676 m)   Wt 206 lb (93.4 kg)   BMI 33.25 kg/m²   Physical Exam  Constitutional:       Appearance: Normal appearance.   Abdominal:      Palpations: Abdomen is soft.      Tenderness: There is no abdominal tenderness.   Neurological:      Mental Status: She is alert.   Psychiatric:         Mood and Affect: Mood normal.         Behavior: Behavior normal.         OBSTETRIC ULTRASOUND  Ultrasound Findings:  Single IUP in breech presentation.    Placenta is posterior.   Cardiac activity is present at 150 bpm  EFW 1575 g ( 3 lb 8 oz); 24%.  AC 3%  SUE is  7.6 cm.  MVP is 3.3 cm  BPP is 8/8.     Normal  UA Dopplers.       The fetal measurements are consistent with fetal growth restriction (AC 3%). No gross ultrasound evidence of structural abnormalities are seen today. The patient understands that ultrasound cannot rule out all structural and chromosomal abnormalities.       See PACS/Imaging Tab For Complete Ultrasound Report  I interpreted the results and reviewed them with the patient.    DISCUSSION  During her visit we discussed and reviewed the following issues:  OBESITY:  Her BMI prior to pregnancy was 31 Please see previous MFM detailed discussion.     Prevention of Preeclampsia   Please see previous MF detailed discussion.         Marijuana (cannabis) is the most common illicit substance used during pregnancy.  Self-reported rates of use vary from 2 to 5 percent in many studies to approximately 30 percent among young, urban, and socioeconomically disadvantaged women. Of women who use marijuana, approximately 50 percent continue use during pregnancy.  Chemical products from marijuana use are transferred across the placenta and into breast milk. In rat models, fetal plasma levels were approximately 10 percent of maternal levels after acute exposure to delta-9-tetrahydrocannabinol (THC), the primary psychoactive cannabinoid. However, repetitive exposure of THC resulted in higher fetal levels.  While there is no high-quality evidence to suggest an increase in congenital anomalies among marijuana users.  Mixed results have also been reported regarding pregnancy outcomes such as low birth weight,  birth, and stillbirth in marijuana users.  It is unclear whether prenatal marijuana exposure affects neurodevelopmental outcome.  Studies suggest that prenatal marijuana exposure does not affect global intelligence, but may impair sustained attention, visual memory, and analysis and integration in exposed preadolescents and adolescents.    FETAL GROWTH RESTRICTION    DISCUSSION    Background  Fetal growth  restriction (FGR) is the final manifestation of a variety of maternal, fetal, and placental conditions. Fetal growth restriction occurs in up to 10% of pregnancies and is second to premature birth as a cause of infant morbidity and mortality. In addition to its significant  impact, FGR also has an impact on long-term health outcomes. Fetal growth restriction remains a complex obstetric problem with disparate published diagnostic criteria, poor detection rates, and limited preventative and treatment options.     Definition  FGR is defined as a sonographic estimated fetal weight (EFW) or abdominal circumference (AC) below the 10th percentile for gestational age.    Classification  Timing  Early onset: < 32 weeks  Late-onset: >= 32 weeks    Severity  Severe: EFW<3%    Management  General Considerations  Management of FGR is based on early diagnosis, optimal fetal surveillance, and timely delivery that reduces  mortality and minimizes short- and long-term morbidity.     Evaluation  A detailed sonogram is advised as up to 20% of cases of early onset FGR are associated with fetal or chromosome abnormalities. In addition, evaluation for chromosomal abnormalities should also be offered to patients with early-onset FGR or those with FGR with a fetal malformation or polyhydramnios Diagnostic testing should include chromosome microarray analysis (CMA) and PCR for CMV.    Umbilical artery Dopplers, amniotic fluid volume (AFV) assessment and cardiotocography are the primary tools to assess fetal well-being in FGR fetuses.    Other testing modalities (including BPP, ductus venosus Dopplers, middle cerebral artery Dopplers and uterine artery Dopplers) have not adequately demonstrated benefit in improving outcomes of pregnancies complicated by FGR .    Guidelines    Initial Management   Detailed obstetric ultrasound examination (CPT code 58874)  Diagnostic Genetic Testing (CMA) for:  early-onset FGR   Sonographic  abnormalities  Polyhydramnios  PCR CMV if patient has amniocentesis  UA Doppler  CTG  Deliver for repetitive late decelerations on CTG  Subsequent management based upon EFW, AC and UA Dopplers    Category  FGR (AC < 10%) with NORMAL UA Dopplers    Subsequent Management  Weekly NST's  Repeat UA Dopplers & AFV in 1-2 weeks; if normal repeat Dopplers with each (q 3 week) growth ultrasound  Repeat growth ultrasound with UA Dopplers q 3 weeks  Delivery advised at 38-39 weeks    IMPRESSION:  IUP at 32w0d   Obesity  Fetal growth restriction AC 3%    RECOMMENDATIONS:  Continue care with Dr. Luu  Weekly NST's  Repeat UA Dopplers & AFV in 1-2 weeks; if normal repeat Dopplers with each (q 3 week) growth ultrasound  Repeat growth ultrasound with UA Dopplers q 3 weeks  Delivery advised at 38-39 weeks  11-20 lb weight gain for pregnancy   Low dose aspirin 162 mg daily      Thank you for allowing me to participate in the care of your patient.  Please do not hesitate to contact me if additional questions or concerns arise.      Sanjuana Zapata M.D.    30 minutes spent in review of records, patient consultation, documentation and coordination of care.  The relevant clinical matter(s) are summarized above.     Note to patient and family  The 21st Century Cures Act makes medical notes available to patients in the interest of transparency.  However, please be advised that this is a medical document.  It is intended as [id number] communication.  It is written and medical language may contain abbreviations or verbiage that are technical and unfamiliar.  It may appear blunt or direct.  Medical documents are intended to carry relevant information, facts as evident, and the clinical opinion of the practitioner.

## 2024-09-25 ENCOUNTER — OFFICE VISIT (OUTPATIENT)
Dept: PERINATAL CARE | Facility: HOSPITAL | Age: 29
End: 2024-09-25
Attending: OBSTETRICS & GYNECOLOGY
Payer: COMMERCIAL

## 2024-09-25 VITALS
HEART RATE: 104 BPM | DIASTOLIC BLOOD PRESSURE: 73 MMHG | HEIGHT: 66 IN | WEIGHT: 202 LBS | BODY MASS INDEX: 32.47 KG/M2 | SYSTOLIC BLOOD PRESSURE: 110 MMHG

## 2024-09-25 DIAGNOSIS — O36.5930 POOR FETAL GROWTH AFFECTING MANAGEMENT OF MOTHER IN THIRD TRIMESTER, SINGLE OR UNSPECIFIED FETUS (HCC): Primary | ICD-10-CM

## 2024-09-25 DIAGNOSIS — O99.213 OTHER OBESITY DUE TO EXCESS CALORIES AFFECTING PREGNANCY IN THIRD TRIMESTER (HCC): ICD-10-CM

## 2024-09-25 DIAGNOSIS — E66.09 OTHER OBESITY DUE TO EXCESS CALORIES AFFECTING PREGNANCY IN THIRD TRIMESTER (HCC): ICD-10-CM

## 2024-09-25 DIAGNOSIS — O36.5930 POOR FETAL GROWTH AFFECTING MANAGEMENT OF MOTHER IN THIRD TRIMESTER, SINGLE OR UNSPECIFIED FETUS (HCC): ICD-10-CM

## 2024-09-25 PROCEDURE — 76827 ECHO EXAM OF FETAL HEART: CPT

## 2024-09-25 PROCEDURE — 76819 FETAL BIOPHYS PROFIL W/O NST: CPT | Performed by: OBSTETRICS & GYNECOLOGY

## 2024-09-25 PROCEDURE — 76820 UMBILICAL ARTERY ECHO: CPT

## 2024-09-25 RX ORDER — CALCIUM CARBONATE 500 MG/1
1 TABLET, CHEWABLE ORAL AS NEEDED
COMMUNITY

## 2024-09-25 NOTE — PROGRESS NOTES
Outpatient Maternal-Fetal Medicine Consultation    Dear Dr. Luu    Thank you for requesting ultrasound evaluation and maternal fetal medicine consultation on your patient Elyssa Saldivar.  As you are aware she is a 29 year old female  with a vo pregnancy and an Estimated Date of Delivery: 24.  A maternal-fetal medicine f/u is today.   Her prenatal records and labs were reviewed.    No new changes.  ROS    HISTORY  OB History    Para Term  AB Living   1             SAB IAB Ectopic Multiple Live Births                  # Outcome Date GA Lbr Mars/2nd Weight Sex Type Anes PTL Lv   1 Current                Allergies:  Not on File NKDA   Current Meds:  Current Outpatient Medications   Medication Sig Dispense Refill    calcium carbonate 500 MG Oral Chew Tab Chew 1 tablet (500 mg total) by mouth as needed for Heartburn.      aspirin 81 MG Oral Tab EC Take 1 tablet (81 mg total) by mouth daily.      PRENATAL MULTIVIT-MIN-FE-FA OR Take by mouth As Directed.          HISTORY:  No past medical history on file. History STI, obesity   No past surgical history on file.   No family history on file.   Social History     Socioeconomic History    Marital status: Single     Social Determinants of Health      Received from [facility]    Housing Stability          PHYSICAL EXAMINATION:  /73 (BP Location: Right arm, Patient Position: Sitting, Cuff Size: adult)   Pulse 104   Ht 5' 6\" (1.676 m)   Wt 202 lb (91.6 kg)   BMI 32.60 kg/m²   Physical Exam  Constitutional:       Appearance: Normal appearance.   Abdominal:      Palpations: Abdomen is soft.      Tenderness: There is no abdominal tenderness.   Neurological:      Mental Status: She is alert.   Psychiatric:         Mood and Affect: Mood normal.         Behavior: Behavior normal.         OBSTETRIC ULTRASOUND   Ultrasound Findings:  Single IUP in breech presentation.    Placenta is posterior.   Cardiac activity is  present at 150 bpm  MVP is 3.8 cm . SUE 13.7 cm  BPP is 8/8.   Elevated Umbilical Artery Doppler with S/D ratio  4.07. End diastolic flow is present.      See PACS/Imaging Tab For Complete Ultrasound Report  I interpreted the results and reviewed them with the patient.    DISCUSSION  During her visit we discussed and reviewed the following issues:  OBESITY:  Her BMI prior to pregnancy was 31 Please see previous MFM detailed discussion.     Prevention of Preeclampsia   Please see previous MFM detailed discussion.         Marijuana (cannabis) is the most common illicit substance used during pregnancy.  Self-reported rates of use vary from 2 to 5 percent in many studies to approximately 30 percent among young, urban, and socioeconomically disadvantaged women. Of women who use marijuana, approximately 50 percent continue use during pregnancy.  Chemical products from marijuana use are transferred across the placenta and into breast milk. In rat models, fetal plasma levels were approximately 10 percent of maternal levels after acute exposure to delta-9-tetrahydrocannabinol (THC), the primary psychoactive cannabinoid. However, repetitive exposure of THC resulted in higher fetal levels.  While there is no high-quality evidence to suggest an increase in congenital anomalies among marijuana users.  Mixed results have also been reported regarding pregnancy outcomes such as low birth weight,  birth, and stillbirth in marijuana users.  It is unclear whether prenatal marijuana exposure affects neurodevelopmental outcome.  Studies suggest that prenatal marijuana exposure does not affect global intelligence, but may impair sustained attention, visual memory, and analysis and integration in exposed preadolescents and adolescents.    FETAL GROWTH RESTRICTION    DISCUSSION    Background  Fetal growth restriction (FGR) is the final manifestation of a variety of maternal, fetal, and placental conditions. Fetal growth restriction  occurs in up to 10% of pregnancies and is second to premature birth as a cause of infant morbidity and mortality. In addition to its significant  impact, FGR also has an impact on long-term health outcomes. Fetal growth restriction remains a complex obstetric problem with disparate published diagnostic criteria, poor detection rates, and limited preventative and treatment options.     Definition  FGR is defined as a sonographic estimated fetal weight (EFW) or abdominal circumference (AC) below the 10th percentile for gestational age.    Classification  Timing  Early onset: < 32 weeks  Late-onset: >= 32 weeks    Severity  Severe: EFW<3%    Please see previous MFM detailed discussion.       Category  FGR with DECREASED  EDV on UA Dopplers (>95% S/D, RI, PI)    Signs and symptoms of preeclampsia were reviewed.   IMPRESSION:  IUP at 33w0d   Obesity  Fetal growth restriction AC 3%  Elevated UA Doppler    RECOMMENDATIONS:  Continue care with Dr. Luu  Weekly NST's   Repeat UA Dopplers & AFV weekly  Repeat growth ultrasound with UA Dopplers q 2 weeks  Delivery advised at 37 weeks if Dopplers remain the same.  11-20 lb weight gain for pregnancy   Low dose aspirin 162 mg daily      Thank you for allowing me to participate in the care of your patient.  Please do not hesitate to contact me if additional questions or concerns arise.      Sanjuana Zapata M.D.    30 minutes spent in review of records, patient consultation, documentation and coordination of care.  The relevant clinical matter(s) are summarized above.     Note to patient and family  The 21st Century Cures Act makes medical notes available to patients in the interest of transparency.  However, please be advised that this is a medical document.  It is intended as [id number] communication.  It is written and medical language may contain abbreviations or verbiage that are technical and unfamiliar.  It may appear blunt or direct.  Medical documents are  intended to carry relevant information, facts as evident, and the clinical opinion of the practitioner.

## 2024-10-02 ENCOUNTER — ULTRASOUND ENCOUNTER (OUTPATIENT)
Dept: PERINATAL CARE | Facility: HOSPITAL | Age: 29
End: 2024-10-02
Attending: OBSTETRICS & GYNECOLOGY
Payer: COMMERCIAL

## 2024-10-02 VITALS
HEIGHT: 66 IN | WEIGHT: 207 LBS | HEART RATE: 85 BPM | BODY MASS INDEX: 33.27 KG/M2 | DIASTOLIC BLOOD PRESSURE: 79 MMHG | SYSTOLIC BLOOD PRESSURE: 128 MMHG

## 2024-10-02 DIAGNOSIS — O99.210 OBESITY AFFECTING PREGNANCY (HCC): ICD-10-CM

## 2024-10-02 DIAGNOSIS — O36.5930 POOR FETAL GROWTH AFFECTING MANAGEMENT OF MOTHER IN THIRD TRIMESTER, SINGLE OR UNSPECIFIED FETUS (HCC): Primary | ICD-10-CM

## 2024-10-02 DIAGNOSIS — O36.5930 POOR FETAL GROWTH AFFECTING MANAGEMENT OF MOTHER IN THIRD TRIMESTER, SINGLE OR UNSPECIFIED FETUS (HCC): ICD-10-CM

## 2024-10-02 DIAGNOSIS — O99.213 OTHER OBESITY DUE TO EXCESS CALORIES AFFECTING PREGNANCY IN THIRD TRIMESTER (HCC): ICD-10-CM

## 2024-10-02 DIAGNOSIS — E66.09 OTHER OBESITY DUE TO EXCESS CALORIES AFFECTING PREGNANCY IN THIRD TRIMESTER (HCC): ICD-10-CM

## 2024-10-02 PROCEDURE — 76819 FETAL BIOPHYS PROFIL W/O NST: CPT | Performed by: OBSTETRICS & GYNECOLOGY

## 2024-10-02 PROCEDURE — 76827 ECHO EXAM OF FETAL HEART: CPT

## 2024-10-02 NOTE — PROGRESS NOTES
Outpatient Maternal-Fetal Medicine Consultation    Dear Dr. Luu    Thank you for requesting ultrasound evaluation and maternal fetal medicine consultation on your patient Elyssa Saldivar.  As you are aware she is a 29 year old female  with a vo pregnancy and an Estimated Date of Delivery: 24.  A maternal-fetal medicine f/u is today.   Her prenatal records and labs were reviewed.    No new changes.  ROS    HISTORY  OB History    Para Term  AB Living   1             SAB IAB Ectopic Multiple Live Births                  # Outcome Date GA Lbr Mars/2nd Weight Sex Type Anes PTL Lv   1 Current                Allergies:  Not on File NKDA   Current Meds:  Current Outpatient Medications   Medication Sig Dispense Refill    aspirin 81 MG Oral Tab EC Take 1 tablet (81 mg total) by mouth daily.      PRENATAL MULTIVIT-MIN-FE-FA OR Take by mouth As Directed.      calcium carbonate 500 MG Oral Chew Tab Chew 1 tablet (500 mg total) by mouth as needed for Heartburn.          HISTORY:  No past medical history on file. History STI, obesity   No past surgical history on file.   No family history on file.   Social History     Socioeconomic History    Marital status: Single     Social Determinants of Health      Received from [facility]    Housing Stability          PHYSICAL EXAMINATION:  /79 (BP Location: Right arm, Patient Position: Sitting, Cuff Size: adult)   Pulse 85   Ht 5' 6\" (1.676 m)   Wt 207 lb (93.9 kg)   BMI 33.41 kg/m²   Physical Exam  Constitutional:       Appearance: Normal appearance.   Abdominal:      Palpations: Abdomen is soft.      Tenderness: There is no abdominal tenderness.   Neurological:      Mental Status: She is alert.   Psychiatric:         Mood and Affect: Mood normal.         Behavior: Behavior normal.           OBSTETRIC ULTRASOUND  Ultrasound Findings:  Single IUP in breech presentation.    Placenta is posterior.   A 3 vessel cord is  noted.  Cardiac activity is present at 149 bpm  MVP is 3.5 cm . SUE 8.5 cm  BPP is 8/8.   Umbilical Artery Doppler is 2.66.       See PACS/Imaging Tab For Complete Ultrasound Report  I interpreted the results and reviewed them with the patient.    DISCUSSION  During her visit we discussed and reviewed the following issues:  OBESITY:  Her BMI prior to pregnancy was 31 Please see previous MFM detailed discussion.     Prevention of Preeclampsia   Please see previous MFM detailed discussion.         Marijuana (cannabis) is the most common illicit substance used during pregnancy.  Self-reported rates of use vary from 2 to 5 percent in many studies to approximately 30 percent among young, urban, and socioeconomically disadvantaged women. Of women who use marijuana, approximately 50 percent continue use during pregnancy.  Chemical products from marijuana use are transferred across the placenta and into breast milk. In rat models, fetal plasma levels were approximately 10 percent of maternal levels after acute exposure to delta-9-tetrahydrocannabinol (THC), the primary psychoactive cannabinoid. However, repetitive exposure of THC resulted in higher fetal levels.  While there is no high-quality evidence to suggest an increase in congenital anomalies among marijuana users.  Mixed results have also been reported regarding pregnancy outcomes such as low birth weight,  birth, and stillbirth in marijuana users.  It is unclear whether prenatal marijuana exposure affects neurodevelopmental outcome.  Studies suggest that prenatal marijuana exposure does not affect global intelligence, but may impair sustained attention, visual memory, and analysis and integration in exposed preadolescents and adolescents.    FETAL GROWTH RESTRICTION    DISCUSSION    Background  Fetal growth restriction (FGR) is the final manifestation of a variety of maternal, fetal, and placental conditions. Fetal growth restriction occurs in up to 10% of  pregnancies and is second to premature birth as a cause of infant morbidity and mortality. In addition to its significant  impact, FGR also has an impact on long-term health outcomes. Fetal growth restriction remains a complex obstetric problem with disparate published diagnostic criteria, poor detection rates, and limited preventative and treatment options.     Definition  FGR is defined as a sonographic estimated fetal weight (EFW) or abdominal circumference (AC) below the 10th percentile for gestational age.    Classification  Timing  Early onset: < 32 weeks  Late-onset: >= 32 weeks    Severity  Severe: EFW<3%    Please see previous MFM detailed discussion.       Category  FGR with DECREASED  EDV on UA Dopplers (>95% S/D, RI, PI)    Signs and symptoms of preeclampsia were reviewed.   IMPRESSION:  IUP at 33w0d   Obesity  Fetal growth restriction AC 3%  Elevated UA Doppler--normal today.    RECOMMENDATIONS:  Continue care with Dr. Luu  Weekly NST's   Repeat UA Dopplers & AFV weekly  Repeat growth ultrasound with UA Dopplers q 2 weeks  Delivery advised at 37 weeks if Dopplers remain the same. Today UA Doppler normal.  11-20 lb weight gain for pregnancy   Low dose aspirin 162 mg daily      Thank you for allowing me to participate in the care of your patient.  Please do not hesitate to contact me if additional questions or concerns arise.      Sanjuana Zapata M.D.    20  minutes spent in review of records, patient consultation, documentation and coordination of care.  The relevant clinical matter(s) are summarized above.     Note to patient and family  The 21st Century Cures Act makes medical notes available to patients in the interest of transparency.  However, please be advised that this is a medical document.  It is intended as plqe-be-phvd communication.  It is written and medical language may contain abbreviations or verbiage that are technical and unfamiliar.  It may appear blunt or direct.  Medical  documents are intended to carry relevant information, facts as evident, and the clinical opinion of the practitioner.

## 2024-10-09 ENCOUNTER — OFFICE VISIT (OUTPATIENT)
Dept: PERINATAL CARE | Facility: HOSPITAL | Age: 29
End: 2024-10-09
Attending: OBSTETRICS & GYNECOLOGY
Payer: COMMERCIAL

## 2024-10-09 VITALS
SYSTOLIC BLOOD PRESSURE: 113 MMHG | BODY MASS INDEX: 33.75 KG/M2 | DIASTOLIC BLOOD PRESSURE: 71 MMHG | HEART RATE: 84 BPM | HEIGHT: 66 IN | WEIGHT: 210 LBS

## 2024-10-09 DIAGNOSIS — O36.5930 POOR FETAL GROWTH AFFECTING MANAGEMENT OF MOTHER IN THIRD TRIMESTER, SINGLE OR UNSPECIFIED FETUS (HCC): ICD-10-CM

## 2024-10-09 DIAGNOSIS — E66.09 OTHER OBESITY DUE TO EXCESS CALORIES AFFECTING PREGNANCY IN THIRD TRIMESTER (HCC): ICD-10-CM

## 2024-10-09 DIAGNOSIS — O99.213 OTHER OBESITY DUE TO EXCESS CALORIES AFFECTING PREGNANCY IN THIRD TRIMESTER (HCC): ICD-10-CM

## 2024-10-09 DIAGNOSIS — O99.210 OBESITY AFFECTING PREGNANCY, ANTEPARTUM, UNSPECIFIED OBESITY TYPE (HCC): Primary | ICD-10-CM

## 2024-10-09 DIAGNOSIS — O99.210 OBESITY AFFECTING PREGNANCY (HCC): ICD-10-CM

## 2024-10-09 DIAGNOSIS — O36.5930 POOR FETAL GROWTH AFFECTING MANAGEMENT OF MOTHER IN THIRD TRIMESTER, SINGLE OR UNSPECIFIED FETUS (HCC): Primary | ICD-10-CM

## 2024-10-09 PROCEDURE — 76820 UMBILICAL ARTERY ECHO: CPT

## 2024-10-09 PROCEDURE — 76819 FETAL BIOPHYS PROFIL W/O NST: CPT

## 2024-10-09 PROCEDURE — 76816 OB US FOLLOW-UP PER FETUS: CPT | Performed by: OBSTETRICS & GYNECOLOGY

## 2024-10-09 NOTE — PROGRESS NOTES
Pt here for Growth Ultrasound  +fm noted per patient  Pt noted swelling bilat hands past 2 days.   Pt denies further complaints

## 2024-10-09 NOTE — PROGRESS NOTES
Outpatient Maternal-Fetal Medicine Consultation    Dear Dr. Luu    Thank you for requesting ultrasound evaluation and maternal fetal medicine consultation on your patient Elyssa Saldivar.  As you are aware she is a 29 year old female  with a vo pregnancy and an Estimated Date of Delivery: 24.  A maternal-fetal medicine f/u is today.   Her prenatal records and labs were reviewed.    No new changes.  38 weeks falls on 10/30/24. She is not sure she wants to delivery on 10/31/24, but understands that her baby could come deliver on any upcoming day..  Her spouse is comfortable with delivery on 10/31/24 should it happen.  ROS    HISTORY  OB History    Para Term  AB Living   1             SAB IAB Ectopic Multiple Live Births                  # Outcome Date GA Lbr Mars/2nd Weight Sex Type Anes PTL Lv   1 Current                Allergies:  Not on File NKDA   Current Meds:  Current Outpatient Medications   Medication Sig Dispense Refill    calcium carbonate 500 MG Oral Chew Tab Chew 1 tablet (500 mg total) by mouth as needed for Heartburn.      PRENATAL MULTIVIT-MIN-FE-FA OR Take by mouth As Directed.      aspirin 81 MG Oral Tab EC Take 1 tablet (81 mg total) by mouth daily. (Patient not taking: Reported on 10/9/2024)          HISTORY:  No past medical history on file. History STI, obesity   No past surgical history on file.   No family history on file.   Social History     Socioeconomic History    Marital status: Single     Social Drivers of Health      Received from [facility]    Housing Stability          PHYSICAL EXAMINATION:  /71 (BP Location: Right arm, Patient Position: Sitting, Cuff Size: large)   Pulse 84   Ht 5' 6\" (1.676 m)   Wt 210 lb (95.3 kg)   BMI 33.89 kg/m²   Physical Exam  Constitutional:       Appearance: Normal appearance.   Abdominal:      Palpations: Abdomen is soft.      Tenderness: There is no abdominal tenderness.   Neurological:       Mental Status: She is alert.   Psychiatric:         Mood and Affect: Mood normal.         Behavior: Behavior normal.           OBSTETRIC ULTRASOUND  Ultrasound Findings:  Single IUP in breech presentation.    Placenta is posterior.   A 3 vessel cord is noted.  Cardiac activity is present at 157 bpm  EFW 2103 g ( 4 lb 10 oz); 15%.  AC < 1%  SUE is  7.5 cm.  MVP is 4.5 cm  BPP is 8/8.   UA Doppler is normal.  The fetal measurements are consistent with with suspected fetal growth restriction. No gross ultrasound evidence of structural abnormalities are seen today. The patient understands that ultrasound cannot rule out all structural and chromosomal abnormalities.       See PACS/Imaging Tab For Complete Ultrasound Report  I interpreted the results and reviewed them with the patient.    DISCUSSION  During her visit we discussed and reviewed the following issues:  OBESITY:  Her BMI prior to pregnancy was 31 Please see previous [facility] detailed discussion.     Prevention of Preeclampsia   Please see previous [facility] detailed discussion.         Marijuana (cannabis) is the most common illicit substance used during pregnancy.  Self-reported rates of use vary from 2 to 5 percent in many studies to approximately 30 percent among young, urban, and socioeconomically disadvantaged women. Of women who use marijuana, approximately 50 percent continue use during pregnancy.  Chemical products from marijuana use are transferred across the placenta and into breast milk. In rat models, fetal plasma levels were approximately 10 percent of maternal levels after acute exposure to delta-9-tetrahydrocannabinol (THC), the primary psychoactive cannabinoid. However, repetitive exposure of THC resulted in higher fetal levels.  While there is no high-quality evidence to suggest an increase in congenital anomalies among marijuana users.  Mixed results have also been reported regarding pregnancy outcomes such as low birth weight,  birth, and  stillbirth in marijuana users.  It is unclear whether prenatal marijuana exposure affects neurodevelopmental outcome.  Studies suggest that prenatal marijuana exposure does not affect global intelligence, but may impair sustained attention, visual memory, and analysis and integration in exposed preadolescents and adolescents.    FETAL GROWTH RESTRICTION    DISCUSSION    Background  Fetal growth restriction (FGR) is the final manifestation of a variety of maternal, fetal, and placental conditions. Fetal growth restriction occurs in up to 10% of pregnancies and is second to premature birth as a cause of infant morbidity and mortality. In addition to its significant  impact, FGR also has an impact on long-term health outcomes. Fetal growth restriction remains a complex obstetric problem with disparate published diagnostic criteria, poor detection rates, and limited preventative and treatment options.     Definition  FGR is defined as a sonographic estimated fetal weight (EFW) or abdominal circumference (AC) below the 10th percentile for gestational age.    Classification  Timing  Early onset: < 32 weeks  Late-onset: >= 32 weeks    Severity  Severe: EFW<3%    Please see previous MFM detailed discussion.       Category  FGR with DECREASED  EDV on UA Dopplers (>95% S/D, RI, PI)  Elevated UA doppler only occurred one time.  Therefore, it is reasonable for her to have delivery 37-38 weeks.  UA Dopplers have been very normal since the elevated one and are not borderline. Timing of delivery discussed with patient.  Plan delivery 37-38 weeks.  Signs and symptoms of preeclampsia were reviewed.   IMPRESSION:  IUP at 33w0d   Obesity  Fetal growth restriction AC <1%  Elevated UA Doppler--normal again today.    RECOMMENDATIONS:  Continue care with Dr. Luu  Weekly NST's   Repeat UA Dopplers & AFV weekly  Repeat growth ultrasound with UA Dopplers q 2 weeks  Delivery advised at 37-38 weeks.  11-20 lb weight gain for  pregnancy   Low dose aspirin 162 mg daily      Thank you for allowing me to participate in the care of your patient.  Please do not hesitate to contact me if additional questions or concerns arise.      Sanjuana Zapata M.D.    20  minutes spent in review of records, patient consultation, documentation and coordination of care.  The relevant clinical matter(s) are summarized above.     Note to patient and family  The 21st Century Cures Act makes medical notes available to patients in the interest of transparency.  However, please be advised that this is a medical document.  It is intended as [id number] communication.  It is written and medical language may contain abbreviations or verbiage that are technical and unfamiliar.  It may appear blunt or direct.  Medical documents are intended to carry relevant information, facts as evident, and the clinical opinion of the practitioner.

## 2024-10-16 ENCOUNTER — ULTRASOUND ENCOUNTER (OUTPATIENT)
Dept: PERINATAL CARE | Facility: HOSPITAL | Age: 29
End: 2024-10-16
Attending: OBSTETRICS & GYNECOLOGY
Payer: COMMERCIAL

## 2024-10-16 VITALS
WEIGHT: 208 LBS | HEIGHT: 66 IN | DIASTOLIC BLOOD PRESSURE: 78 MMHG | SYSTOLIC BLOOD PRESSURE: 123 MMHG | BODY MASS INDEX: 33.43 KG/M2 | HEART RATE: 82 BPM

## 2024-10-16 DIAGNOSIS — O36.5930 POOR FETAL GROWTH AFFECTING MANAGEMENT OF MOTHER IN THIRD TRIMESTER, SINGLE OR UNSPECIFIED FETUS (HCC): ICD-10-CM

## 2024-10-16 DIAGNOSIS — O99.210 OBESITY AFFECTING PREGNANCY (HCC): ICD-10-CM

## 2024-10-16 DIAGNOSIS — O99.210 OBESITY AFFECTING PREGNANCY, ANTEPARTUM, UNSPECIFIED OBESITY TYPE (HCC): Primary | ICD-10-CM

## 2024-10-16 DIAGNOSIS — O36.5930 POOR FETAL GROWTH AFFECTING MANAGEMENT OF MOTHER IN THIRD TRIMESTER, SINGLE OR UNSPECIFIED FETUS (HCC): Primary | ICD-10-CM

## 2024-10-16 DIAGNOSIS — O99.213 OTHER OBESITY DUE TO EXCESS CALORIES AFFECTING PREGNANCY IN THIRD TRIMESTER (HCC): ICD-10-CM

## 2024-10-16 DIAGNOSIS — E66.09 OTHER OBESITY DUE TO EXCESS CALORIES AFFECTING PREGNANCY IN THIRD TRIMESTER (HCC): ICD-10-CM

## 2024-10-16 PROCEDURE — 76820 UMBILICAL ARTERY ECHO: CPT

## 2024-10-16 PROCEDURE — 76819 FETAL BIOPHYS PROFIL W/O NST: CPT | Performed by: OBSTETRICS & GYNECOLOGY

## 2024-10-16 NOTE — PROGRESS NOTES
Pt here for UA Dopplers/BPP  + FM  Pt states feeling occas [facility] ctx, denies vag bleeding and leaking fluid.

## 2024-10-16 NOTE — PROGRESS NOTES
Outpatient Maternal-Fetal Medicine Consultation    Dear Dr. Luu    Thank you for requesting ultrasound evaluation and maternal fetal medicine consultation on your patient Elyssa Saldivar.  As you are aware she is a 29 year old female  with a vo pregnancy and an Estimated Date of Delivery: 24.  A maternal-fetal medicine f/u is today.   Her prenatal records and labs were reviewed.    No new changes.  Good fetal movement.  Meets with OB in 2 days.  ROS    HISTORY  OB History    Para Term  AB Living   1             SAB IAB Ectopic Multiple Live Births                  # Outcome Date GA Lbr Mars/2nd Weight Sex Type Anes PTL Lv   1 Current                Allergies:  Not on File NKDA   Current Meds:  Current Outpatient Medications   Medication Sig Dispense Refill    calcium carbonate 500 MG Oral Chew Tab Chew 1 tablet (500 mg total) by mouth as needed for Heartburn.      PRENATAL MULTIVIT-MIN-FE-FA OR Take by mouth As Directed.      aspirin 81 MG Oral Tab EC Take 1 tablet (81 mg total) by mouth daily. (Patient not taking: Reported on 10/9/2024)          HISTORY:  No past medical history on file. History STI, obesity   No past surgical history on file.   No family history on file.   Social History     Socioeconomic History    Marital status: Single     Social Drivers of Health      Received from [facility]    Housing Stability          PHYSICAL EXAMINATION:  /78 (BP Location: Right arm, Patient Position: Sitting, Cuff Size: adult)   Pulse 82   Ht 5' 6\" (1.676 m)   Wt 208 lb (94.3 kg)   BMI 33.57 kg/m²   Physical Exam  Constitutional:       Appearance: Normal appearance.   Abdominal:      Palpations: Abdomen is soft.      Tenderness: There is no abdominal tenderness.   Neurological:      Mental Status: She is alert.   Psychiatric:         Mood and Affect: Mood normal.         Behavior: Behavior normal.           OBSTETRIC ULTRASOUND  Ultrasound  Findings:  Single IUP in breech presentation.    Placenta is posterior, right.   A 3 vessel cord is noted.  Cardiac activity is present at 138 bpm  MVP is 3.4 cm . SUE 8.9 cm  BPP is 8/8.   Umbilical Artery Doppler is 2.85.      See PACS/Imaging Tab For Complete Ultrasound Report  I interpreted the results and reviewed them with the patient.    DISCUSSION  During her visit we discussed and reviewed the following issues:  OBESITY:  Her BMI prior to pregnancy was 31 Please see previous MFM detailed discussion.     Prevention of Preeclampsia   Please see previous [facility] detailed discussion.         Marijuana (cannabis) is the most common illicit substance used during pregnancy.  Self-reported rates of use vary from 2 to 5 percent in many studies to approximately 30 percent among young, urban, and socioeconomically disadvantaged women. Of women who use marijuana, approximately 50 percent continue use during pregnancy.  Chemical products from marijuana use are transferred across the placenta and into breast milk. In rat models, fetal plasma levels were approximately 10 percent of maternal levels after acute exposure to delta-9-tetrahydrocannabinol (THC), the primary psychoactive cannabinoid. However, repetitive exposure of THC resulted in higher fetal levels.  While there is no high-quality evidence to suggest an increase in congenital anomalies among marijuana users.  Mixed results have also been reported regarding pregnancy outcomes such as low birth weight,  birth, and stillbirth in marijuana users.  It is unclear whether prenatal marijuana exposure affects neurodevelopmental outcome.  Studies suggest that prenatal marijuana exposure does not affect global intelligence, but may impair sustained attention, visual memory, and analysis and integration in exposed preadolescents and adolescents.    FETAL GROWTH RESTRICTION    DISCUSSION    Background  Fetal growth restriction (FGR) is the final manifestation of a  variety of maternal, fetal, and placental conditions. Fetal growth restriction occurs in up to 10% of pregnancies and is second to premature birth as a cause of infant morbidity and mortality. In addition to its significant  impact, FGR also has an impact on long-term health outcomes. Fetal growth restriction remains a complex obstetric problem with disparate published diagnostic criteria, poor detection rates, and limited preventative and treatment options.     Definition  FGR is defined as a sonographic estimated fetal weight (EFW) or abdominal circumference (AC) below the 10th percentile for gestational age.    Classification  Timing  Early onset: < 32 weeks  Late-onset: >= 32 weeks    Severity  Severe: EFW<3%    Please see previous MFM detailed discussion.       Category  FGR with DECREASED  EDV on UA Dopplers (>95% S/D, RI, PI)  Elevated UA doppler only occurred one time.  Therefore, it is reasonable for her to have delivery 37-38 weeks.  UA Dopplers have been very normal since the elevated one and are not borderline. Timing of delivery discussed with patient.  Plan delivery 37-38 weeks.  Signs and symptoms of preeclampsia were reviewed.   IMPRESSION:  IUP at 36w0d    Obesity  Fetal growth restriction AC <1%  Elevated UA Doppler--normal again today.    RECOMMENDATIONS:  Continue care with Dr. Luu  Weekly NST's   Repeat UA Dopplers & AFV weekly  Repeat growth ultrasound with UA Dopplers q 2 weeks  Delivery advised at 37-38 weeks.  11-20 lb weight gain for pregnancy   Low dose aspirin 162 mg daily      Thank you for allowing me to participate in the care of your patient.  Please do not hesitate to contact me if additional questions or concerns arise.      Sanjuana Zapata M.D.    20  minutes spent in review of records, patient consultation, documentation and coordination of care.  The relevant clinical matter(s) are summarized above.     Note to patient and family  The 21st Century Cures Act makes  medical notes available to patients in the interest of transparency.  However, please be advised that this is a medical document.  It is intended as [id number]-fm-xhqo communication.  It is written and medical language may contain abbreviations or verbiage that are technical and unfamiliar.  It may appear blunt or direct.  Medical documents are intended to carry relevant information, facts as evident, and the clinical opinion of the practitioner.

## 2024-10-23 ENCOUNTER — ULTRASOUND ENCOUNTER (OUTPATIENT)
Dept: PERINATAL CARE | Facility: HOSPITAL | Age: 29
End: 2024-10-23
Attending: OBSTETRICS & GYNECOLOGY
Payer: COMMERCIAL

## 2024-10-23 VITALS
SYSTOLIC BLOOD PRESSURE: 133 MMHG | WEIGHT: 206 LBS | HEART RATE: 71 BPM | BODY MASS INDEX: 33.11 KG/M2 | DIASTOLIC BLOOD PRESSURE: 83 MMHG | HEIGHT: 66 IN

## 2024-10-23 DIAGNOSIS — O99.213 OTHER OBESITY DUE TO EXCESS CALORIES AFFECTING PREGNANCY IN THIRD TRIMESTER (HCC): ICD-10-CM

## 2024-10-23 DIAGNOSIS — O36.5930 POOR FETAL GROWTH AFFECTING MANAGEMENT OF MOTHER IN THIRD TRIMESTER, SINGLE OR UNSPECIFIED FETUS (HCC): ICD-10-CM

## 2024-10-23 DIAGNOSIS — E66.09 OTHER OBESITY DUE TO EXCESS CALORIES AFFECTING PREGNANCY IN THIRD TRIMESTER (HCC): ICD-10-CM

## 2024-10-23 DIAGNOSIS — O36.5930 POOR FETAL GROWTH AFFECTING MANAGEMENT OF MOTHER IN THIRD TRIMESTER, SINGLE OR UNSPECIFIED FETUS (HCC): Primary | ICD-10-CM

## 2024-10-23 DIAGNOSIS — O99.210 OBESITY AFFECTING PREGNANCY (HCC): ICD-10-CM

## 2024-10-23 PROCEDURE — 76819 FETAL BIOPHYS PROFIL W/O NST: CPT

## 2024-10-23 PROCEDURE — 76816 OB US FOLLOW-UP PER FETUS: CPT | Performed by: OBSTETRICS & GYNECOLOGY

## 2024-10-23 PROCEDURE — 76820 UMBILICAL ARTERY ECHO: CPT

## 2024-10-23 NOTE — PROGRESS NOTES
Outpatient Maternal-Fetal Medicine Consultation    Dear Dr. Luu    Thank you for requesting ultrasound evaluation and maternal fetal medicine consultation on your patient Elyssa Saldivar.  As you are aware she is a 29 year old female  with a vo pregnancy and an Estimated Date of Delivery: 24.  A maternal-fetal medicine f/u is today.   Her prenatal records and labs were reviewed.    Induction to start at 9 pm on 10/29/24  ROS    HISTORY  OB History    Para Term  AB Living   1             SAB IAB Ectopic Multiple Live Births                  # Outcome Date GA Lbr Mars/2nd Weight Sex Type Anes PTL Lv   1 Current                Allergies:  Not on File NKDA   Current Meds:  Current Outpatient Medications   Medication Sig Dispense Refill    calcium carbonate 500 MG Oral Chew Tab Chew 1 tablet (500 mg total) by mouth as needed for Heartburn.      PRENATAL MULTIVIT-MIN-FE-FA OR Take by mouth As Directed.      aspirin 81 MG Oral Tab EC Take 1 tablet (81 mg total) by mouth daily. (Patient not taking: Reported on 10/9/2024)          HISTORY:  No past medical history on file. History STI, obesity   No past surgical history on file.   No family history on file.   Social History     Socioeconomic History    Marital status: Single     Social Drivers of Health      Received from [facility]    Housing Stability          PHYSICAL EXAMINATION:  /83 (BP Location: Right arm, Patient Position: Sitting, Cuff Size: adult)   Pulse 71   Ht 5' 6\" (1.676 m)   Wt 206 lb (93.4 kg)   BMI 33.25 kg/m²   Physical Exam  Constitutional:       Appearance: Normal appearance.   Abdominal:      Palpations: Abdomen is soft.      Tenderness: There is no abdominal tenderness.   Neurological:      Mental Status: She is alert.   Psychiatric:         Mood and Affect: Mood normal.         Behavior: Behavior normal.           OBSTETRIC ULTRASOUND    Ultrasound Findings:  Single IUP in breech  presentation.    Placenta is posterior.   Cardiac activity is present at 153 bpm  EFW 2593 g ( 5 lb 11 oz); 16%.    SUE is  7 cm.  MVP is 3.9 cm  BPP is 8/8.   normal umbilical Doppler  The fetal measurements are consistent with established EDC. No gross ultrasound evidence of structural abnormalities are seen today. The patient understands that ultrasound cannot rule out all structural and chromosomal abnormalities.     See PACS/Imaging Tab For Complete Ultrasound Report  I interpreted the results and reviewed them with the patient.    DISCUSSION  During her visit we discussed and reviewed the following issues:  OBESITY:  Her BMI prior to pregnancy was 31 Please see previous [facility] detailed discussion.     Prevention of Preeclampsia   Please see previous [facility] detailed discussion.         Marijuana (cannabis) is the most common illicit substance used during pregnancy.  Self-reported rates of use vary from 2 to 5 percent in many studies to approximately 30 percent among young, urban, and socioeconomically disadvantaged women. Of women who use marijuana, approximately 50 percent continue use during pregnancy.  Chemical products from marijuana use are transferred across the placenta and into breast milk. In rat models, fetal plasma levels were approximately 10 percent of maternal levels after acute exposure to delta-9-tetrahydrocannabinol (THC), the primary psychoactive cannabinoid. However, repetitive exposure of THC resulted in higher fetal levels.  While there is no high-quality evidence to suggest an increase in congenital anomalies among marijuana users.  Mixed results have also been reported regarding pregnancy outcomes such as low birth weight,  birth, and stillbirth in marijuana users.  It is unclear whether prenatal marijuana exposure affects neurodevelopmental outcome.  Studies suggest that prenatal marijuana exposure does not affect global intelligence, but may impair sustained attention, visual memory,  and analysis and integration in exposed preadolescents and adolescents.    FETAL GROWTH RESTRICTION    DISCUSSION    Background  Fetal growth restriction (FGR) is the final manifestation of a variety of maternal, fetal, and placental conditions. Fetal growth restriction occurs in up to 10% of pregnancies and is second to premature birth as a cause of infant morbidity and mortality. In addition to its significant  impact, FGR also has an impact on long-term health outcomes. Fetal growth restriction remains a complex obstetric problem with disparate published diagnostic criteria, poor detection rates, and limited preventative and treatment options.     Definition  FGR is defined as a sonographic estimated fetal weight (EFW) or abdominal circumference (AC) below the 10th percentile for gestational age.    Classification  Timing  Early onset: < 32 weeks  Late-onset: >= 32 weeks    Severity  Severe: EFW<3%    Please see previous MFM detailed discussion.       Category  FGR with DECREASED  EDV on UA Dopplers (>95% S/D, RI, PI)  Elevated UA doppler only occurred one time.  Therefore, it is reasonable for her to have delivery 37-38 weeks.  UA Dopplers have been very normal since the elevated one and are not borderline. Timing of delivery discussed with patient.  Plan delivery 37-38 weeks.  Signs and symptoms of preeclampsia were reviewed.   IMPRESSION:  IUP at  37w0d   Obesity  Fetal growth restriction AC <1%  Elevated UA Doppler--normal again today.    RECOMMENDATIONS:  Continue care with Dr. Luu  Weekly NST's   Delivery advised at 37-38 weeks. Induction scheduled night of 10/29/24  11-20 lb weight gain for pregnancy   Low dose aspirin 162 mg daily      Thank you for allowing me to participate in the care of your patient.  Please do not hesitate to contact me if additional questions or concerns arise.      Sanjuana Zapata M.D.    20  minutes spent in review of records, patient consultation, documentation and  coordination of care.  The relevant clinical matter(s) are summarized above.     Note to patient and family  The 21st Century Cures Act makes medical notes available to patients in the interest of transparency.  However, please be advised that this is a medical document.  It is intended as htwl-xk-tokq communication.  It is written and medical language may contain abbreviations or verbiage that are technical and unfamiliar.  It may appear blunt or direct.  Medical documents are intended to carry relevant information, facts as evident, and the clinical opinion of the practitioner.

## 2024-10-23 NOTE — PROGRESS NOTES
Pt here for growth ultrasound/BPP/UA Dopplers  + FM  Pt states feeling occas [facility] [facility] ctx, denies vag bleeding and leaking fluid.

## 2024-10-28 ENCOUNTER — TELEPHONE (OUTPATIENT)
Dept: OBGYN UNIT | Facility: HOSPITAL | Age: 29
End: 2024-10-28

## 2024-10-29 ENCOUNTER — ANESTHESIA (OUTPATIENT)
Dept: OBGYN UNIT | Facility: HOSPITAL | Age: 29
End: 2024-10-29
Payer: COMMERCIAL

## 2024-10-29 ENCOUNTER — HOSPITAL ENCOUNTER (INPATIENT)
Facility: HOSPITAL | Age: 29
LOS: 3 days | Discharge: HOME | End: 2024-11-01
Attending: OBSTETRICS & GYNECOLOGY | Admitting: OBSTETRICS & GYNECOLOGY
Payer: COMMERCIAL

## 2024-10-29 ENCOUNTER — ANESTHESIA EVENT (OUTPATIENT)
Dept: OBGYN UNIT | Facility: HOSPITAL | Age: 29
End: 2024-10-29
Payer: COMMERCIAL

## 2024-10-29 PROBLEM — Z34.90 PREGNANT (HCC): Status: ACTIVE | Noted: 2024-10-29

## 2024-10-29 LAB
ANTIBODY SCREEN: NEGATIVE
BASOPHILS # BLD AUTO: 0.1 X10(3) UL (ref 0–0.2)
BASOPHILS NFR BLD AUTO: 0.9 %
EOSINOPHIL # BLD AUTO: 0.13 X10(3) UL (ref 0–0.7)
EOSINOPHIL NFR BLD AUTO: 1.2 %
ERYTHROCYTE [DISTWIDTH] IN BLOOD BY AUTOMATED COUNT: 12.9 %
HCT VFR BLD AUTO: 39 %
HGB BLD-MCNC: 13.4 G/DL
IMM GRANULOCYTES # BLD AUTO: 0.08 X10(3) UL (ref 0–1)
IMM GRANULOCYTES NFR BLD: 0.7 %
LYMPHOCYTES # BLD AUTO: 2.46 X10(3) UL (ref 1–4)
LYMPHOCYTES NFR BLD AUTO: 22 %
MCH RBC QN AUTO: 31.3 PG (ref 26–34)
MCHC RBC AUTO-ENTMCNC: 34.4 G/DL (ref 31–37)
MCV RBC AUTO: 91.1 FL
MONOCYTES # BLD AUTO: 0.86 X10(3) UL (ref 0.1–1)
MONOCYTES NFR BLD AUTO: 7.7 %
NEUTROPHILS # BLD AUTO: 7.54 X10 (3) UL (ref 1.5–7.7)
NEUTROPHILS # BLD AUTO: 7.54 X10(3) UL (ref 1.5–7.7)
NEUTROPHILS NFR BLD AUTO: 67.5 %
PLATELET # BLD AUTO: 180 10(3)UL (ref 150–450)
RBC # BLD AUTO: 4.28 X10(6)UL
RH BLOOD TYPE: POSITIVE
RH BLOOD TYPE: POSITIVE
T PALLIDUM AB SER QL IA: NONREACTIVE
WBC # BLD AUTO: 11.2 X10(3) UL (ref 4–11)

## 2024-10-29 PROCEDURE — 86900 BLOOD TYPING SEROLOGIC ABO: CPT | Performed by: OBSTETRICS & GYNECOLOGY

## 2024-10-29 PROCEDURE — 86850 RBC ANTIBODY SCREEN: CPT | Performed by: OBSTETRICS & GYNECOLOGY

## 2024-10-29 PROCEDURE — 88307 TISSUE EXAM BY PATHOLOGIST: CPT | Performed by: OBSTETRICS & GYNECOLOGY

## 2024-10-29 PROCEDURE — 85025 COMPLETE CBC W/AUTO DIFF WBC: CPT | Performed by: OBSTETRICS & GYNECOLOGY

## 2024-10-29 PROCEDURE — 86901 BLOOD TYPING SEROLOGIC RH(D): CPT | Performed by: OBSTETRICS & GYNECOLOGY

## 2024-10-29 PROCEDURE — 86780 TREPONEMA PALLIDUM: CPT | Performed by: OBSTETRICS & GYNECOLOGY

## 2024-10-29 PROCEDURE — 0UB00ZZ EXCISION OF RIGHT OVARY, OPEN APPROACH: ICD-10-PCS | Performed by: OBSTETRICS & GYNECOLOGY

## 2024-10-29 PROCEDURE — 88305 TISSUE EXAM BY PATHOLOGIST: CPT | Performed by: OBSTETRICS & GYNECOLOGY

## 2024-10-29 RX ORDER — MORPHINE SULFATE 2 MG/ML
INJECTION, SOLUTION INTRAMUSCULAR; INTRAVENOUS AS NEEDED
Status: DISCONTINUED | OUTPATIENT
Start: 2024-10-29 | End: 2024-10-29 | Stop reason: SURG

## 2024-10-29 RX ORDER — KETOROLAC TROMETHAMINE 30 MG/ML
30 INJECTION, SOLUTION INTRAMUSCULAR; INTRAVENOUS EVERY 6 HOURS
Status: DISPENSED | OUTPATIENT
Start: 2024-10-29 | End: 2024-10-30

## 2024-10-29 RX ORDER — BUPIVACAINE HYDROCHLORIDE 7.5 MG/ML
INJECTION, SOLUTION INTRASPINAL AS NEEDED
Status: DISCONTINUED | OUTPATIENT
Start: 2024-10-29 | End: 2024-10-29 | Stop reason: SURG

## 2024-10-29 RX ORDER — AMMONIA INHALANTS 0.04 G/.3ML
0.3 INHALANT RESPIRATORY (INHALATION) AS NEEDED
Status: DISCONTINUED | OUTPATIENT
Start: 2024-10-29 | End: 2024-11-01

## 2024-10-29 RX ORDER — BISACODYL 10 MG
10 SUPPOSITORY, RECTAL RECTAL ONCE AS NEEDED
Status: DISCONTINUED | OUTPATIENT
Start: 2024-10-29 | End: 2024-11-01

## 2024-10-29 RX ORDER — ACETAMINOPHEN 500 MG
1000 TABLET ORAL EVERY 6 HOURS
Status: DISCONTINUED | OUTPATIENT
Start: 2024-10-29 | End: 2024-11-01

## 2024-10-29 RX ORDER — KETOROLAC TROMETHAMINE 30 MG/ML
30 INJECTION, SOLUTION INTRAMUSCULAR; INTRAVENOUS ONCE
Status: COMPLETED | OUTPATIENT
Start: 2024-10-29 | End: 2024-10-29

## 2024-10-29 RX ORDER — IBUPROFEN 600 MG/1
600 TABLET, FILM COATED ORAL EVERY 6 HOURS
Status: DISCONTINUED | OUTPATIENT
Start: 2024-10-30 | End: 2024-11-01

## 2024-10-29 RX ORDER — GABAPENTIN 300 MG/1
300 CAPSULE ORAL EVERY 8 HOURS PRN
Status: DISCONTINUED | OUTPATIENT
Start: 2024-10-29 | End: 2024-11-01

## 2024-10-29 RX ORDER — SIMETHICONE 80 MG
80 TABLET,CHEWABLE ORAL 3 TIMES DAILY PRN
Status: DISCONTINUED | OUTPATIENT
Start: 2024-10-29 | End: 2024-11-01

## 2024-10-29 RX ORDER — DOCUSATE SODIUM 100 MG/1
100 CAPSULE, LIQUID FILLED ORAL
Status: DISCONTINUED | OUTPATIENT
Start: 2024-10-29 | End: 2024-11-01

## 2024-10-29 RX ORDER — SODIUM CHLORIDE, SODIUM LACTATE, POTASSIUM CHLORIDE, CALCIUM CHLORIDE 600; 310; 30; 20 MG/100ML; MG/100ML; MG/100ML; MG/100ML
125 INJECTION, SOLUTION INTRAVENOUS CONTINUOUS
Status: DISCONTINUED | OUTPATIENT
Start: 2024-10-29 | End: 2024-10-29 | Stop reason: HOSPADM

## 2024-10-29 RX ORDER — NALBUPHINE HYDROCHLORIDE 10 MG/ML
2.5 INJECTION INTRAMUSCULAR; INTRAVENOUS; SUBCUTANEOUS
Status: DISCONTINUED | OUTPATIENT
Start: 2024-10-29 | End: 2024-10-29 | Stop reason: HOSPADM

## 2024-10-29 RX ORDER — DEXAMETHASONE SODIUM PHOSPHATE 4 MG/ML
VIAL (ML) INJECTION AS NEEDED
Status: DISCONTINUED | OUTPATIENT
Start: 2024-10-29 | End: 2024-10-29 | Stop reason: SURG

## 2024-10-29 RX ORDER — ONDANSETRON 2 MG/ML
4 INJECTION INTRAMUSCULAR; INTRAVENOUS EVERY 6 HOURS PRN
Status: DISCONTINUED | OUTPATIENT
Start: 2024-10-29 | End: 2024-11-01

## 2024-10-29 RX ORDER — ONDANSETRON 2 MG/ML
4 INJECTION INTRAMUSCULAR; INTRAVENOUS ONCE AS NEEDED
Status: DISCONTINUED | OUTPATIENT
Start: 2024-10-29 | End: 2024-10-29 | Stop reason: HOSPADM

## 2024-10-29 RX ORDER — LIDOCAINE HYDROCHLORIDE 10 MG/ML
INJECTION, SOLUTION EPIDURAL; INFILTRATION; INTRACAUDAL; PERINEURAL AS NEEDED
Status: DISCONTINUED | OUTPATIENT
Start: 2024-10-29 | End: 2024-10-29 | Stop reason: SURG

## 2024-10-29 RX ORDER — ONDANSETRON 2 MG/ML
4 INJECTION INTRAMUSCULAR; INTRAVENOUS EVERY 6 HOURS PRN
Status: DISCONTINUED | OUTPATIENT
Start: 2024-10-29 | End: 2024-10-29 | Stop reason: HOSPADM

## 2024-10-29 RX ORDER — KETOROLAC TROMETHAMINE 30 MG/ML
INJECTION, SOLUTION INTRAMUSCULAR; INTRAVENOUS
Status: COMPLETED
Start: 2024-10-29 | End: 2024-10-29

## 2024-10-29 RX ORDER — PHENYLEPHRINE HCL 10 MG/ML
VIAL (ML) INJECTION AS NEEDED
Status: DISCONTINUED | OUTPATIENT
Start: 2024-10-29 | End: 2024-10-29 | Stop reason: SURG

## 2024-10-29 RX ORDER — ACETAMINOPHEN 500 MG
1000 TABLET ORAL ONCE
Status: COMPLETED | OUTPATIENT
Start: 2024-10-29 | End: 2024-10-29

## 2024-10-29 RX ORDER — CITRIC ACID/SODIUM CITRATE 334-500MG
30 SOLUTION, ORAL ORAL ONCE
Status: COMPLETED | OUTPATIENT
Start: 2024-10-29 | End: 2024-10-29

## 2024-10-29 RX ORDER — SODIUM CHLORIDE, SODIUM LACTATE, POTASSIUM CHLORIDE, CALCIUM CHLORIDE 600; 310; 30; 20 MG/100ML; MG/100ML; MG/100ML; MG/100ML
INJECTION, SOLUTION INTRAVENOUS CONTINUOUS
Status: DISCONTINUED | OUTPATIENT
Start: 2024-10-29 | End: 2024-11-01

## 2024-10-29 RX ORDER — DEXTROSE, SODIUM CHLORIDE, SODIUM LACTATE, POTASSIUM CHLORIDE, AND CALCIUM CHLORIDE 5; .6; .31; .03; .02 G/100ML; G/100ML; G/100ML; G/100ML; G/100ML
INJECTION, SOLUTION INTRAVENOUS CONTINUOUS PRN
Status: DISCONTINUED | OUTPATIENT
Start: 2024-10-29 | End: 2024-11-01

## 2024-10-29 RX ORDER — ONDANSETRON 2 MG/ML
INJECTION INTRAMUSCULAR; INTRAVENOUS AS NEEDED
Status: DISCONTINUED | OUTPATIENT
Start: 2024-10-29 | End: 2024-10-29 | Stop reason: SURG

## 2024-10-29 RX ADMIN — BUPIVACAINE HYDROCHLORIDE 1.6 ML: 7.5 INJECTION, SOLUTION INTRASPINAL at 10:55:00

## 2024-10-29 RX ADMIN — PHENYLEPHRINE HCL 150 MCG: 10 MG/ML VIAL (ML) INJECTION at 11:11:00

## 2024-10-29 RX ADMIN — LIDOCAINE HYDROCHLORIDE 3 ML: 10 INJECTION, SOLUTION EPIDURAL; INFILTRATION; INTRACAUDAL; PERINEURAL at 10:52:00

## 2024-10-29 RX ADMIN — PHENYLEPHRINE HCL 100 MCG: 10 MG/ML VIAL (ML) INJECTION at 10:59:00

## 2024-10-29 RX ADMIN — PHENYLEPHRINE HCL 100 MCG: 10 MG/ML VIAL (ML) INJECTION at 11:18:00

## 2024-10-29 RX ADMIN — DEXAMETHASONE SODIUM PHOSPHATE 4 MG: 4 MG/ML VIAL (ML) INJECTION at 10:59:00

## 2024-10-29 RX ADMIN — PHENYLEPHRINE HCL 100 MCG: 10 MG/ML VIAL (ML) INJECTION at 10:56:00

## 2024-10-29 RX ADMIN — SODIUM CHLORIDE, SODIUM LACTATE, POTASSIUM CHLORIDE, CALCIUM CHLORIDE: 600; 310; 30; 20 INJECTION, SOLUTION INTRAVENOUS at 11:06:00

## 2024-10-29 RX ADMIN — PHENYLEPHRINE HCL 100 MCG: 10 MG/ML VIAL (ML) INJECTION at 11:07:00

## 2024-10-29 RX ADMIN — ONDANSETRON 4 MG: 2 INJECTION INTRAMUSCULAR; INTRAVENOUS at 10:59:00

## 2024-10-29 RX ADMIN — PHENYLEPHRINE HCL 100 MCG: 10 MG/ML VIAL (ML) INJECTION at 11:02:00

## 2024-10-29 RX ADMIN — MORPHINE SULFATE 0.2 MG: 2 INJECTION, SOLUTION INTRAMUSCULAR; INTRAVENOUS at 10:55:00

## 2024-10-29 NOTE — ANESTHESIA PREPROCEDURE EVALUATION
PRE-OP EVALUATION    Patient Name: Elyssa Saldivar    Admit Diagnosis: Pregnancy (HCC) [Z34.90]    Pre-op Diagnosis: * No pre-op diagnosis entered *     SECTION    Anesthesia Procedure:  SECTION    Surgeons and Role:     * Bruce Smith MD - Primary    Pre-op vitals reviewed.  Temp: 97.8 °F (36.6 °C)  Pulse: 105  Resp: 18  BP: 127/68     Body mass index is 33.25 kg/m².    Current medications reviewed.  Hospital Medications:   lactated ringers IV bolus 1,000 mL  1,000 mL Intravenous Once    Followed by    lactated ringers infusion  125 mL/hr Intravenous Continuous    acetaminophen (Tylenol Extra Strength) tab 1,000 mg  1,000 mg Oral Once    ondansetron (Zofran) 4 MG/2ML injection 4 mg  4 mg Intravenous Q6H PRN    sodium citrate-citric acid (Bicitra) 500-334 MG/5ML oral solution 30 mL  30 mL Oral Once    oxyTOCIN in sodium chloride 0.9% (Pitocin) 30 Units/500mL infusion premix  62.5-900 linda-units/min Intravenous Continuous    ceFAZolin (Ancef) 2g in 10mL IV syringe premix  2 g Intravenous Once       Outpatient Medications:   Prescriptions Prior to Admission[1]    Allergies: Patient has no known allergies.      Anesthesia Evaluation    Patient summary reviewed.    Anesthetic Complications           GI/Hepatic/Renal                                 Cardiovascular        Exercise tolerance: good     MET: >4                                           Endo/Other                                  Pulmonary                           Neuro/Psych                                      History reviewed. No pertinent surgical history.  Social History     Socioeconomic History    Marital status: Single   Tobacco Use    Smoking status: Never     Passive exposure: Never    Smokeless tobacco: Never   Substance and Sexual Activity    Alcohol use: Not Currently    Drug use: Not Currently     Types: Cannabis     Comment: stopped 2024     History   Drug Use Unknown     Comment: stopped 2024     Available  pre-op labs reviewed.  Lab Results   Component Value Date    WBC 11.2 (H) 10/29/2024    RBC 4.28 10/29/2024    HGB 13.4 10/29/2024    HCT 39.0 10/29/2024    MCV 91.1 10/29/2024    MCH 31.3 10/29/2024    MCHC 34.4 10/29/2024    RDW 12.9 10/29/2024    .0 10/29/2024               Airway      Mallampati: II  Mouth opening: 3 FB  TM distance: 4 - 6 cm  Neck ROM: full Cardiovascular    Cardiovascular exam normal.         Dental    Dentition appears grossly intact         Pulmonary    Pulmonary exam normal.                 Other findings              ASA: 2   Plan: spinal  NPO status verified and patient meets guidelines.          Plan/risks discussed with: patient  Use of blood product(s) discussed with: patient    Consented to blood products.          Present on Admission:  **None**             [1]   Medications Prior to Admission   Medication Sig Dispense Refill Last Dose/Taking    calcium carbonate 500 MG Oral Chew Tab Chew 1 tablet (500 mg total) by mouth as needed for Heartburn.   Past Week    aspirin 81 MG Oral Tab EC Take 1 tablet (81 mg total) by mouth daily.   Past Month    PRENATAL MULTIVIT-MIN-FE-FA OR Take by mouth As Directed.   Past Week

## 2024-10-29 NOTE — ANESTHESIA POSTPROCEDURE EVALUATION
[facility]    Elyssa Saldivar Patient Status:  Inpatient   Age/Gender 29 year old female MRN [id number]   Location [facility] 1SW-J Attending Bruce Smith MD   Hosp Day # 0 PCP No primary care provider on file.       Anesthesia Post-op Note     SECTION    Procedure Summary       Date: 10/29/24 Room / Location:  L+D OR   L+D OR    Anesthesia Start: 1049 Anesthesia Stop: 1157    Procedure:  SECTION (Abdomen) Diagnosis: (same)    Surgeons: Bruce Smith MD Anesthesiologist: Quentin Smith MD    Anesthesia Type: spinal ASA Status: 2            Anesthesia Type: spinal    Vitals Value Taken Time   /84 10/29/24 1345   Temp 97.6 °F (36.4 °C) 10/29/24 1156   Pulse 74 10/29/24 1356   Resp 15 10/29/24 1356   SpO2 97 % 10/29/24 1356   Vitals shown include unfiled device data.    Patient Location: Labor and Delivery    Anesthesia Type: spinal    Airway Patency: patent    Postop Pain Control: adequate    Mental Status: preanesthetic baseline    Nausea/Vomiting: none    Cardiopulmonary/Hydration status: stable euvolemic    Complications: no apparent anesthesia related complications    Postop vital signs: stable    Dental Exam: Unchanged from Preop    Patient to be discharged from PACU when criteria met.

## 2024-10-29 NOTE — PLAN OF CARE
Problem: BIRTH - VAGINAL/ SECTION  Goal: Fetal and maternal status remain reassuring during the birth process  Description: INTERVENTIONS:  - Monitor vital signs  - Monitor fetal heart rate  - Monitor uterine activity  - Monitor labor progression (vaginal delivery)  - DVT prophylaxis (C/S delivery)  - Surgical antibiotic prophylaxis (C/S delivery)  Outcome: Completed     Problem: PAIN - ADULT  Goal: Verbalizes/displays adequate comfort level or patient's stated pain goal  Description: INTERVENTIONS:  - Encourage pt to monitor pain and request assistance  - Assess pain using appropriate pain scale  - Administer analgesics based on type and severity of pain and evaluate response  - Implement non-pharmacological measures as appropriate and evaluate response  - Consider cultural and social influences on pain and pain management  - Manage/alleviate anxiety  - Utilize distraction and/or relaxation techniques  - Monitor for opioid side effects  - Notify MD/LIP if interventions unsuccessful or patient reports new pain  - Anticipate increased pain with activity and pre-medicate as appropriate  Outcome: Completed     Problem: ANXIETY  Goal: Will report anxiety at manageable levels  Description: INTERVENTIONS:  - Administer medication as ordered  - Teach and rehearse alternative coping skills  - Provide emotional support with 1:1 interaction with staff  Outcome: Completed     Problem: Patient/Family Goals  Goal: Patient/Family Long Term Goal  Description: Patient's Long Term Goal: Safe delivery for mom and baby    Interventions:  -   - See additional Care Plan goals for specific interventions  Outcome: Completed  Goal: Patient/Family Short Term Goal  Description: Patient's Short Term Goal: Adequate pain control    Interventions:   -   - See additional Care Plan goals for specific interventions  Outcome: Completed

## 2024-10-29 NOTE — PROGRESS NOTES
Patient transferred to mother/baby room 1112 per cart in stable condition with personal belongings.  Accompanied staff.  Report given to mother/baby ROSANNA Hernandez. Baby got transferred to NICU accompanied by RN and father of the baby.

## 2024-10-29 NOTE — OPERATIVE REPORT
PREOPERATIVE DIAGNOSIS:   1. 37 week pregnancy  2. Fetal growth restriction  3. Breech presentation    POSTOPERATIVE DIAGNOSIS:   Same  4. Right ovarian dermoid cyst    PROCEDURE PERFORMED: primary low transverse  section. R ovarian cystectomy.     SURGEON: Bruce Smtih MD    ASSISTANT: MD Jaden    ANESTHESIA: Spinal    FINDINGS: A female infant was delivered from rupal breech presentation. Amniotic fluid was clear. Uterus and bilateral adnexa were notable for a 3 cm cystic area of the right ovary, with internal contents including hair and thick material consistent with a dermoid.    PROCEDURE:   The involvement of the assisting physician was requested and provided aid in exposure/retraction, hemostasis, closure, and other intraoperative technical functions to help me as the primary surgeon carry out a safe operation with optimized results/outcome.  After informed consent, the patient was taken to the operating room, where anesthesia was dosed. The patient was placed supine in a left tilt. Shahid catheter was in place in the bladder and the abdomen was prepped and draped.   Incision:   [place] N     Pelosi Y  Incision was created using a combination of sharp, cautery, and blunt dissection. Once abdomen was safely entered, a bladder blade was placed. Transverse lower segment incision was made with a scalpel and extended digitally. Amniotomy occurred during uterine incision when the patient coughed.   The fetal buttocks were brought through the uterine incision with manual guidance and extrinsic abdominal pressure. Arms were delivered with routine maneuvers and the head delivered in flexion.  There was no difficulty with delivering the fetus.   The infant was bulb suctioned. Cord was doubly clamped after the appropriate delay then cut, and the infant was taken to the neonatologist.   Placenta was delivered: manually N    expressed Y  An Sanjeev retractor was placed, and the uterus was internally  inspected and cleared of debris. Uterine incision was closed with a continuous locking 0-Vicryl suture.    Second layer of imbrication: N    R ovary with opened on the antimesenteric side with cautery. Contents were aspirated and the cyst wall easily dissected out. The cyst bed was made hemostatic with small amount of cautery and the ovary closed with 2-0 vicryl in locking fashion.   Hemostasis aided by: Isolated hemostatic sutures Y  electrosurgery Y  Gutters were cleared. We rechecked the uterine and bladder flap hemostasis and with minimal cautery found it to be good. Sanjeev was removed. Subfascial area was inspected and found to be dry. 0 Vicryl was used to close the fascia in a continuous running fashion. Subcutaneous tissue was irrigated, dried, and found to be hemostatic.  2-0 plain gut used to reapproximate the subcutaneous fat: Y  Skin was closed with 4-0 Vicryl in a subcuticular fashion. Mastisol and Steri-Strips were applied.   All sponge, needle, and instrument counts were correct. Estimated blood loss was 700 ml. Infant and mother went to the recovery room in good condition.

## 2024-10-29 NOTE — H&P
[facility]  Labor and Delivery Prenatal History and Physical Interval Addendum  Please see full Prenatal Record for this pregnancy      SUBJECTIVE:    Interval History:     This is a pregnancy at 37 weeks admitted for primary  delivery.  Pregnancy notable for:   Fetal growth restriction; EFW last week 16%, AC <1%  Breech presentation    OBJECTIVE:    The patient is comfortable    Fetal Surveillance:  see NST in holding      ASSESSMENT/PLAN:    37+ wk pregnancy, fetal growth restriction  For PCS due to breech presentation  Gbs status: neg

## 2024-10-29 NOTE — ANESTHESIA PROCEDURE NOTES
Spinal Block    Date/Time: 10/29/2024 10:51 AM    Performed by: Quentin Smith MD  Authorized by: Quentin Smith MD      General Information and Staff    Start Time:  10/29/2024 10:51 AM  End Time:  10/29/2024 10:55 AM  Anesthesiologist:  Quentin Smith MD  Performed by:  Anesthesiologist  Patient Location:  OB  Site identification: surface landmarks    Reason for Block: surgical anesthesia    Preanesthetic Checklist: patient identified, IV checked, risks and benefits discussed, monitors and equipment checked, pre-op evaluation, timeout performed, anesthesia consent and sterile technique used      Procedure Details    Patient Position:  Sitting  Prep: ChloraPrep    Monitoring:  Cardiac monitor, heart rate and continuous pulse ox  Approach:  Midline  Location:  L3-4  Injection Technique:  Single-shot    Needle    Needle Type:  Sprotte  Needle Gauge:  24 G  Needle Length:  3.5 in    Assessment    Sensory Level:  T6  Events: clear CSF, CSF aspirated, well tolerated and blood negative      Additional Comments

## 2024-10-30 LAB
BASOPHILS # BLD AUTO: 0.11 X10(3) UL (ref 0–0.2)
BASOPHILS NFR BLD AUTO: 0.8 %
EOSINOPHIL # BLD AUTO: 0.21 X10(3) UL (ref 0–0.7)
EOSINOPHIL NFR BLD AUTO: 1.5 %
ERYTHROCYTE [DISTWIDTH] IN BLOOD BY AUTOMATED COUNT: 12.9 %
HCT VFR BLD AUTO: 38.7 %
HGB BLD-MCNC: 13.3 G/DL
IMM GRANULOCYTES # BLD AUTO: 0.07 X10(3) UL (ref 0–1)
IMM GRANULOCYTES NFR BLD: 0.5 %
LYMPHOCYTES # BLD AUTO: 3.25 X10(3) UL (ref 1–4)
LYMPHOCYTES NFR BLD AUTO: 23.8 %
MCH RBC QN AUTO: 31.3 PG (ref 26–34)
MCHC RBC AUTO-ENTMCNC: 34.4 G/DL (ref 31–37)
MCV RBC AUTO: 91.1 FL
MONOCYTES # BLD AUTO: 1.42 X10(3) UL (ref 0.1–1)
MONOCYTES NFR BLD AUTO: 10.4 %
NEUTROPHILS # BLD AUTO: 8.58 X10 (3) UL (ref 1.5–7.7)
NEUTROPHILS # BLD AUTO: 8.58 X10(3) UL (ref 1.5–7.7)
NEUTROPHILS NFR BLD AUTO: 63 %
PLATELET # BLD AUTO: 161 10(3)UL (ref 150–450)
RBC # BLD AUTO: 4.25 X10(6)UL
WBC # BLD AUTO: 13.6 X10(3) UL (ref 4–11)

## 2024-10-30 PROCEDURE — 85025 COMPLETE CBC W/AUTO DIFF WBC: CPT | Performed by: OBSTETRICS & GYNECOLOGY

## 2024-10-30 NOTE — CM/SW NOTE
met with Elyssa (patient/Mom) and father of infant (on phone)  to review insurance and PCP for infant in NICU. INfant will be added to Elyssa's [facility] insurance plan.  reviewed adding infant on to plan and Elyssa is going to call her employer to get infant added to plan. Elyssa is aware that she needs to have infant added to plan with in 30 days of infants birth and that employer will ask for SSI number for infant and birth certificate. PCP for infant will be Dr TREMAINE Rubi with [facility] in [place], [place]. Elyssa plans on breast feeding and has breast pump. Couple have crib and car seat for infant has well.  reviewed SDOH and provided resources from find help.org on food pantries in the [place] area.  reviewed all SDOH and Elyssa had no other concerns at this time.

## 2024-10-30 NOTE — CM/SW NOTE
met with patient (Elyssa ) to review [facility] order. Elyssa stated that there are times that they do eat but they don't have a lot of food. Sometimes they have to eat cereal for meals. Elyssa stated that she did not want to meet with SW/CN because she knew that they made to much money to qualify for [facility].  stated that there are other resources such as food pantries. Some food pantries do have finical stipulations attached to if you can use there food pantry.  provide 5 different food pantries with locations, phone number, and general information describing the services.  asked if Elyssa would like these resources and did she feel these might help? Elyssa was going to take the resources and she thought that it might help.

## 2024-10-30 NOTE — PROGRESS NOTES
Labor Analgesia Follow Up Note    Patient underwent epidural anesthesia for labor analgesia,    Placenta Date/Time: 10/29/2024 11:13 AM    Delivery Date/Time:: 10/29/2024  11:16 AM    /78 (BP Location: Left arm)   Pulse 73   Temp 97.5 °F (36.4 °C) (Oral)   Resp 16   Ht 1.676 m (5' 6\")   Wt 93.4 kg (206 lb)   LMP 01/26/2024   SpO2 100%   Breastfeeding Yes   BMI 33.25 kg/m²     Assessment:  Patient seen and no apparent anesthesia related complications.    Thank you for asking us to participate in the care of your patient.

## 2024-10-30 NOTE — PROGRESS NOTES
Postop Day 1    Pt without complaints.     Temp:  [97.5 °F (36.4 °C)-98.2 °F (36.8 °C)] 97.5 °F (36.4 °C)  Pulse:  [60-88] 73  Resp:  [14-25] 16  BP: (110-134)/(64-86) 116/78  SpO2:  [96 %-100 %] 100 %    Intake/Output Summary (Last 24 hours) at 10/30/2024 1252  Last data filed at 10/30/2024 1218  Gross per 24 hour   Intake 910 ml   Output 2515 ml   Net -1605 ml     abd  soft, NT, ND, fundus firm below umbilicus, incision C/D/I  extr  trace edema, no calf tenderness  Recent Labs   Lab 10/29/24  0907 10/30/24  0852   RBC 4.28 4.25   HGB 13.4 13.3   HCT 39.0 38.7   MCV 91.1 91.1   MCH 31.3 31.3   MCHC 34.4 34.4   RDW 12.9 12.9   NEPRELIM 7.54 8.58*   WBC 11.2* 13.6*   .0 161.0     Impression: POD#1  Plan:  Ambulation encouraged.    Advance diet as tolerated.    Continue postpartum care.

## 2024-10-31 PROCEDURE — 3E0134Z INTRODUCTION OF SERUM, TOXOID AND VACCINE INTO SUBCUTANEOUS TISSUE, PERCUTANEOUS APPROACH: ICD-10-PCS | Performed by: OBSTETRICS & GYNECOLOGY

## 2024-10-31 PROCEDURE — 90471 IMMUNIZATION ADMIN: CPT

## 2024-10-31 NOTE — PROGRESS NOTES
No anesthesia related problems.  Duramorph given with no significant side effects.  Site clean and dry.

## 2024-10-31 NOTE — PROGRESS NOTES
S: pt without complaints.  Positive flatus  O: VS-Temp:  [97.5 °F (36.4 °C)-98.3 °F (36.8 °C)] 98.3 °F (36.8 °C)  Pulse:  [73-81] 81  Resp:  [16] 16  BP: (116-126)/(78-83) 126/83  SpO2:  [100 %] 100 %       Abdomen: soft, ND, NT  Incision- CDI       Extremities: 1+ edema, NT Bilateral lower extremities       CBC:   Lab Results   Component Value Date    WBC 13.6 10/30/2024    RBC 4.25 10/30/2024    HGB 13.3 10/30/2024    HCT 38.7 10/30/2024    MCV 91.1 10/30/2024    MCH 31.3 10/30/2024    MCHC 34.4 10/30/2024    RDW 12.9 10/30/2024    .0 10/30/2024     A/P:      1. POD #2 s/p C/S      2. Doing well      3. Home 1-2 days

## 2024-11-01 VITALS
WEIGHT: 206 LBS | RESPIRATION RATE: 16 BRPM | DIASTOLIC BLOOD PRESSURE: 82 MMHG | TEMPERATURE: 98 F | BODY MASS INDEX: 33.11 KG/M2 | OXYGEN SATURATION: 100 % | HEART RATE: 69 BPM | HEIGHT: 66 IN | SYSTOLIC BLOOD PRESSURE: 128 MMHG

## 2024-11-01 PROBLEM — Z34.90 PREGNANT (HCC): Status: RESOLVED | Noted: 2024-10-29 | Resolved: 2024-11-01

## 2024-11-01 RX ORDER — GABAPENTIN 300 MG/1
300 CAPSULE ORAL EVERY 8 HOURS PRN
Qty: 9 CAPSULE | Refills: 0 | Status: SHIPPED | OUTPATIENT
Start: 2024-11-01

## 2024-11-01 NOTE — DISCHARGE INSTRUCTIONS
Pain medications for home:  Take 600mg motrin (ibuprofen) every 6 hours as needed for pain.  Take 1000mg tylenol (acetaminophen) every 6 hours as needed for pain.  Take 300mg gabapentin every 8 hours as needed for breakthrough pain despite tylenol and motrin.

## 2024-11-01 NOTE — PROGRESS NOTES
Postpartum Progress Note    SUBJECTIVE:    Post-op day #3 s/p primary  section and right ovarian cystectomy    No overnight events.  No complaints.  Ambulating, tolerating PO, voiding, passing flatus and had a bowel movement.        OBJECTIVE:    Vital signs in last 24 hours:  Temp:  [97.8 °F (36.6 °C)-98.5 °F (36.9 °C)] 97.8 °F (36.6 °C)  Pulse:  [69-79] 69  Resp:  [16-18] 16  BP: (107-136)/(82-90) 128/82  Input/Output:  No intake or output data in the 24 hours ending 24 0956    Gen: appears well, nad  Abd: soft, appropriate post-op tenderness, fundus firm below umbilicus  Inc: c/d/i with sutures/steris  Niyah: minimal lochia  Ext: nontender, trace edema    Recent Labs   Lab 10/29/24  0907 10/30/24  0852   RBC 4.28 4.25   HGB 13.4 13.3   HCT 39.0 38.7   MCV 91.1 91.1   MCH 31.3 31.3   MCHC 34.4 34.4   RDW 12.9 12.9   NEPRELIM 7.54 8.58*   WBC 11.2* 13.6*   .0 161.0     ASSESSMENT/PLAN:  POD #3 s/p primary  section and right ovarian cystectomy  Recovering well  Discharge home today - instructions reviewed, follow up in 2 weeks for incision check and 6 weeks for postpartum visit    Shobha Luu DO  2024  9:56 AM

## 2024-11-01 NOTE — PROGRESS NOTES
NURSING DISCHARGE NOTE    Discharged Home via Wheelchair.  Accompanied by Family member  Belongings Taken by patient/family. Verbalized good understanding of discharge instructions.

## 2024-11-04 ENCOUNTER — TELEPHONE (OUTPATIENT)
Dept: OBGYN UNIT | Facility: HOSPITAL | Age: 29
End: 2024-11-04

## 2024-11-04 NOTE — PROGRESS NOTES
Reviewed self and infant care w / mom, she verbalizes understanding of instructions reviewed. Encourage to follow up w/ MDs as directed and w/ questions/concerns. Br well, doing well, ped appt this am and enc to join ct

## 2024-11-05 NOTE — DISCHARGE SUMMARY
[facility]  OB/GYN Surgeon Discharge Summary    Elyssa Saldivar Patient Status:  Inpatient    1995 MRN [id number]   Location [facility] 1SW-J Attending No att. providers found   Hosp Day # 3 PCP No primary care provider on file.       Admit date: 10/29/2024    Discharge date : 24    Admitting Physician: Bruce Smith MD   Discharging Physician: Shobha Luu DO     Reason for admission:   Pregnancy at 37w6d gestation  Fetal growth restriction  Fetal breech presentation    Procedures:   Primary low transverse  section    Hospital Course:   Elyssa Saldivar is a 29 year old  who presented to L&D at 37w6d gestation for scheduled primary section due to fetal growth restriction and fetal breech presentation.  She underwent an uncomplicated primary  section.  She was also noted to have a right ovarian dermoid cyst during time of  and underwent right ovarian cystectomy, which was also uncomplicated.  She had a normal postpartum recovery.  She was discharged home in stable condition on postpartum day #3 with returns precautions discussed.      Discharge Diagnoses:  Pregnancy at 37w6d gestation, delivered by  section  History of  section   Status post primary low transverse  section  Right ovarian cyst, status post right ovarian cystectomy    Discharged Condition: good    Disposition: home    Patient Instructions:   Follow-up appointment with Dr. Smith in 2 weeks for incision check and in 6 weeks for postpartum exam.      Shobha Luu DO  2024  11:30 AM

## (undated) DEVICE — LARGE, DISPOSABLE ALEXIS O C-SECTION PROTECTOR - RETRACTOR: Brand: ALEXIS ® O C-SECTION PROTECTOR - RETRACTOR

## (undated) NOTE — LETTER
2024      Shobha Luu, DO  [address]  [facility] [address]  Via Outside Provider Messaging  Patient: Elyssa Saldivar  : 1995    Dear Colleague:  Thank you for referring your patient to me for a Maternal Fetal Medicine evaluation. Please see my attached note for my findings and recommendations.      Should you have any questions or concerns, please do not hesitate to contact me at the number listed below.    Best Regards,      Sanjuana Zapata MD  [facility]   [address]  [facility] [address]  [phone]    cc: No Recipients      [facility] Department of Maternal Fetal Medicine  Patient Name: Elyssa Saldivar  Patient : 1995  Physician: Sanjuana Zapata MD    Pt here for growth ultrasound/BPP  + FM  No complaints    Outpatient Maternal-Fetal Medicine Consultation    Dear Dr. Luu    Thank you for requesting ultrasound evaluation and maternal fetal medicine consultation on your patient Elyssa Saldivar.  As you are aware she is a 29 year old female  with a vo pregnancy and an Estimated Date of Delivery: 24.  A maternal-fetal medicine f/u is today.   Her prenatal records and labs were reviewed.    No new changes.  ROS    HISTORY  OB History    Para Term  AB Living   1             SAB IAB Ectopic Multiple Live Births                  # Outcome Date GA Lbr Mars/2nd Weight Sex Type Anes PTL Lv   1 Current                Allergies:  Not on File NKDA   Current Meds:  Current Outpatient Medications   Medication Sig Dispense Refill    aspirin 81 MG Oral Tab EC Take 1 tablet (81 mg total) by mouth daily.      PRENATAL MULTIVIT-MIN-FE-FA OR Take by mouth As Directed.          HISTORY:  No past medical history on file. History STI, obesity   No past surgical history on file.   No family history on file.   Social History     Socioeconomic History    Marital status: Single     Social Determinants of Health       Received from [facility]    Housing Stability          PHYSICAL EXAMINATION:  /75 (BP Location: Right arm, Patient Position: Sitting, Cuff Size: adult)   Pulse 83   Ht 5' 6\" (1.676 m)   Wt 206 lb (93.4 kg)   BMI 33.25 kg/m²   Physical Exam  Constitutional:       Appearance: Normal appearance.   Abdominal:      Palpations: Abdomen is soft.      Tenderness: There is no abdominal tenderness.   Neurological:      Mental Status: She is alert.   Psychiatric:         Mood and Affect: Mood normal.         Behavior: Behavior normal.         OBSTETRIC ULTRASOUND  Ultrasound Findings:  Single IUP in breech presentation.    Placenta is posterior.   Cardiac activity is present at 150 bpm  EFW 1575 g ( 3 lb 8 oz); 24%.  AC 3%  SUE is  7.6 cm.  MVP is 3.3 cm  BPP is 8/8.     Normal UA Dopplers.       The fetal measurements are consistent with fetal growth restriction (AC 3%). No gross ultrasound evidence of structural abnormalities are seen today. The patient understands that ultrasound cannot rule out all structural and chromosomal abnormalities.       See PACS/Imaging Tab For Complete Ultrasound Report  I interpreted the results and reviewed them with the patient.    DISCUSSION  During her visit we discussed and reviewed the following issues:  OBESITY:  Her BMI prior to pregnancy was 31 Please see previous [facility] detailed discussion.     Prevention of Preeclampsia   Please see previous [facility] detailed discussion.         Marijuana (cannabis) is the most common illicit substance used during pregnancy.  Self-reported rates of use vary from 2 to 5 percent in many studies to approximately 30 percent among young, urban, and socioeconomically disadvantaged women. Of women who use marijuana, approximately 50 percent continue use during pregnancy.  Chemical products from marijuana use are transferred across the placenta and into breast milk. In rat models, fetal plasma levels were approximately 10 percent of  maternal levels after acute exposure to delta-9-tetrahydrocannabinol (THC), the primary psychoactive cannabinoid. However, repetitive exposure of THC resulted in higher fetal levels.  While there is no high-quality evidence to suggest an increase in congenital anomalies among marijuana users.  Mixed results have also been reported regarding pregnancy outcomes such as low birth weight,  birth, and stillbirth in marijuana users.  It is unclear whether prenatal marijuana exposure affects neurodevelopmental outcome.  Studies suggest that prenatal marijuana exposure does not affect global intelligence, but may impair sustained attention, visual memory, and analysis and integration in exposed preadolescents and adolescents.    FETAL GROWTH RESTRICTION    DISCUSSION    Background  Fetal growth restriction (FGR) is the final manifestation of a variety of maternal, fetal, and placental conditions. Fetal growth restriction occurs in up to 10% of pregnancies and is second to premature birth as a cause of infant morbidity and mortality. In addition to its significant  impact, FGR also has an impact on long-term health outcomes. Fetal growth restriction remains a complex obstetric problem with disparate published diagnostic criteria, poor detection rates, and limited preventative and treatment options.     Definition  FGR is defined as a sonographic estimated fetal weight (EFW) or abdominal circumference (AC) below the 10th percentile for gestational age.    Classification  Timing  Early onset: < 32 weeks  Late-onset: >= 32 weeks    Severity  Severe: EFW<3%    Management  General Considerations  Management of FGR is based on early diagnosis, optimal fetal surveillance, and timely delivery that reduces  mortality and minimizes short- and long-term morbidity.     Evaluation  A detailed sonogram is advised as up to 20% of cases of early onset FGR are associated with fetal or chromosome abnormalities. In  addition, evaluation for chromosomal abnormalities should also be offered to patients with early-onset FGR or those with FGR with a fetal malformation or polyhydramnios Diagnostic testing should include chromosome microarray analysis (CMA) and PCR for CMV.    Umbilical artery Dopplers, amniotic fluid volume (AFV) assessment and cardiotocography are the primary tools to assess fetal well-being in FGR fetuses.    Other testing modalities (including BPP, ductus venosus Dopplers, middle cerebral artery Dopplers and uterine artery Dopplers) have not adequately demonstrated benefit in improving outcomes of pregnancies complicated by FGR .    Guidelines    Initial Management   Detailed obstetric ultrasound examination (CPT code 19456)  Diagnostic Genetic Testing (CMA) for:  early-onset FGR   Sonographic abnormalities  Polyhydramnios  PCR CMV if patient has amniocentesis  UA Doppler  CTG  Deliver for repetitive late decelerations on CTG  Subsequent management based upon EFW, AC and UA Dopplers    Category  FGR (AC < 10%) with NORMAL UA Dopplers    Subsequent Management  Weekly NST's  Repeat UA Dopplers & AFV in 1-2 weeks; if normal repeat Dopplers with each (q 3 week) growth ultrasound  Repeat growth ultrasound with UA Dopplers q 3 weeks  Delivery advised at 38-39 weeks    IMPRESSION:  IUP at 32w0d   Obesity  Fetal growth restriction AC 3%    RECOMMENDATIONS:  Continue care with Dr. Luu  Weekly NST's  Repeat UA Dopplers & AFV in 1-2 weeks; if normal repeat Dopplers with each (q 3 week) growth ultrasound  Repeat growth ultrasound with UA Dopplers q 3 weeks  Delivery advised at 38-39 weeks  11-20 lb weight gain for pregnancy   Low dose aspirin 162 mg daily      Thank you for allowing me to participate in the care of your patient.  Please do not hesitate to contact me if additional questions or concerns arise.      Sanjuana Zapata M.D.    30 minutes spent in review of records, patient consultation, documentation and  coordination of care.  The relevant clinical matter(s) are summarized above.     Note to patient and family  The 21st Century Cures Act makes medical notes available to patients in the interest of transparency.  However, please be advised that this is a medical document.  It is intended as iunr-lz-zioh communication.  It is written and medical language may contain abbreviations or verbiage that are technical and unfamiliar.  It may appear blunt or direct.  Medical documents are intended to carry relevant information, facts as evident, and the clinical opinion of the practitioner.

## (undated) NOTE — LETTER
2024      Shobha Luu, DO  [address]  [facility] [address]  Via Outside Provider Messaging  Patient: Elyssa Saldivar  : 1995    Dear Colleague:  Thank you for referring your patient to me for a Maternal Fetal Medicine evaluation. Please see my attached note for my findings and recommendations.      Should you have any questions or concerns, please do not hesitate to contact me at the number listed below.    Best Regards,      Sanjuana Zapata MD  [facility]   [address]  [facility] [address]  [phone]    cc: No Recipients      [facility] Department of Maternal Fetal Medicine  Patient Name: Elyssa Saldivar  Patient : 1995  Physician: Sanjuana Zapata MD    Pt here for UA Dopplers/BPP  + FM  Pt states feeling occas Pool Link ctx, denies vag bleeding and leaking fluid.    Outpatient Maternal-Fetal Medicine Consultation    Dear Dr. Luu    Thank you for requesting ultrasound evaluation and maternal fetal medicine consultation on your patient Elyssa Saldivar.  As you are aware she is a 29 year old female  with a vo pregnancy and an Estimated Date of Delivery: 24.  A maternal-fetal medicine f/u is today.   Her prenatal records and labs were reviewed.    No new changes.  Good fetal movement.  Meets with OB in 2 days.  ROS    HISTORY  OB History    Para Term  AB Living   1             SAB IAB Ectopic Multiple Live Births                  # Outcome Date GA Lbr Mars/2nd Weight Sex Type Anes PTL Lv   1 Current                Allergies:  Not on File NKDA   Current Meds:  Current Outpatient Medications   Medication Sig Dispense Refill   • calcium carbonate 500 MG Oral Chew Tab Chew 1 tablet (500 mg total) by mouth as needed for Heartburn.     • PRENATAL MULTIVIT-MIN-FE-FA OR Take by mouth As Directed.     • aspirin 81 MG Oral Tab EC Take 1 tablet (81 mg total) by mouth daily. (Patient not taking:  Reported on 10/9/2024)          HISTORY:  No past medical history on file. History STI, obesity   No past surgical history on file.   No family history on file.   Social History     Socioeconomic History   • Marital status: Single     Social Drivers of Health      Received from [facility]    Housing Stability          PHYSICAL EXAMINATION:  /78 (BP Location: Right arm, Patient Position: Sitting, Cuff Size: adult)   Pulse 82   Ht 5' 6\" (1.676 m)   Wt 208 lb (94.3 kg)   BMI 33.57 kg/m²   Physical Exam  Constitutional:       Appearance: Normal appearance.   Abdominal:      Palpations: Abdomen is soft.      Tenderness: There is no abdominal tenderness.   Neurological:      Mental Status: She is alert.   Psychiatric:         Mood and Affect: Mood normal.         Behavior: Behavior normal.           OBSTETRIC ULTRASOUND  Ultrasound Findings:  Single IUP in breech presentation.    Placenta is posterior, right.   A 3 vessel cord is noted.  Cardiac activity is present at 138 bpm  MVP is 3.4 cm . SUE 8.9 cm  BPP is 8/8.   Umbilical Artery Doppler is 2.85.      See PACS/Imaging Tab For Complete Ultrasound Report  I interpreted the results and reviewed them with the patient.    DISCUSSION  During her visit we discussed and reviewed the following issues:  OBESITY:  Her BMI prior to pregnancy was 31 Please see previous [facility] detailed discussion.     Prevention of Preeclampsia   Please see previous [facility] detailed discussion.         Marijuana (cannabis) is the most common illicit substance used during pregnancy.  Self-reported rates of use vary from 2 to 5 percent in many studies to approximately 30 percent among young, urban, and socioeconomically disadvantaged women. Of women who use marijuana, approximately 50 percent continue use during pregnancy.  Chemical products from marijuana use are transferred across the placenta and into breast milk. In rat models, fetal plasma levels were approximately 10  percent of maternal levels after acute exposure to delta-9-tetrahydrocannabinol (THC), the primary psychoactive cannabinoid. However, repetitive exposure of THC resulted in higher fetal levels.  While there is no high-quality evidence to suggest an increase in congenital anomalies among marijuana users.  Mixed results have also been reported regarding pregnancy outcomes such as low birth weight,  birth, and stillbirth in marijuana users.  It is unclear whether prenatal marijuana exposure affects neurodevelopmental outcome.  Studies suggest that prenatal marijuana exposure does not affect global intelligence, but may impair sustained attention, visual memory, and analysis and integration in exposed preadolescents and adolescents.    FETAL GROWTH RESTRICTION    DISCUSSION    Background  Fetal growth restriction (FGR) is the final manifestation of a variety of maternal, fetal, and placental conditions. Fetal growth restriction occurs in up to 10% of pregnancies and is second to premature birth as a cause of infant morbidity and mortality. In addition to its significant  impact, FGR also has an impact on long-term health outcomes. Fetal growth restriction remains a complex obstetric problem with disparate published diagnostic criteria, poor detection rates, and limited preventative and treatment options.     Definition  FGR is defined as a sonographic estimated fetal weight (EFW) or abdominal circumference (AC) below the 10th percentile for gestational age.    Classification  Timing  Early onset: < 32 weeks  Late-onset: >= 32 weeks    Severity  Severe: EFW<3%    Please see previous [facility] detailed discussion.       Category  FGR with DECREASED  EDV on UA Dopplers (>95% S/D, RI, PI)  Elevated UA doppler only occurred one time.  Therefore, it is reasonable for her to have delivery 37-38 weeks.  UA Dopplers have been very normal since the elevated one and are not borderline. Timing of delivery discussed with  patient.  Plan delivery 37-38 weeks.  Signs and symptoms of preeclampsia were reviewed.   IMPRESSION:  IUP at 36w0d    Obesity  Fetal growth restriction AC <1%  Elevated UA Doppler--normal again today.    RECOMMENDATIONS:  Continue care with Dr. Luu  Weekly NST's   Repeat UA Dopplers & AFV weekly  Repeat growth ultrasound with UA Dopplers q 2 weeks  Delivery advised at 37-38 weeks.  11-20 lb weight gain for pregnancy   Low dose aspirin 162 mg daily      Thank you for allowing me to participate in the care of your patient.  Please do not hesitate to contact me if additional questions or concerns arise.      Sanjuana Zapata M.D.    20  minutes spent in review of records, patient consultation, documentation and coordination of care.  The relevant clinical matter(s) are summarized above.     Note to patient and family  The 21st Century Cures Act makes medical notes available to patients in the interest of transparency.  However, please be advised that this is a medical document.  It is intended as yxld-jl-ntrf communication.  It is written and medical language may contain abbreviations or verbiage that are technical and unfamiliar.  It may appear blunt or direct.  Medical documents are intended to carry relevant information, facts as evident, and the clinical opinion of the practitioner.

## (undated) NOTE — LETTER
2024      Shobha Luu, DO  [address]  [facility] [address]  Via Outside Provider Messaging  Patient: Elyssa Saldivar  : 1995    Dear Colleague:  Thank you for referring your patient to me for a Maternal Fetal Medicine evaluation. Please see my attached note for my findings and recommendations.      Should you have any questions or concerns, please do not hesitate to contact me at the number listed below.    Best Regards,      Sanjuana Zapata MD  [facility]   [address]  [facility] [address]  [phone]    cc: No Recipients      [facility] Department of Maternal Fetal Medicine  Patient Name: Elyssa Sadlivar  Patient : 1995  Physician: Sanjuana Zapata MD    Outpatient Maternal-Fetal Medicine Consultation    Dear Dr. Luu    Thank you for requesting ultrasound evaluation and maternal fetal medicine consultation on your patient Elyssa Saldivar.  As you are aware she is a 29 year old female  with a vo pregnancy and an Estimated Date of Delivery: 24.  A maternal-fetal medicine f/u is today.   Her prenatal records and labs were reviewed.    Induction to start at 9 pm on 10/29/24  ROS    HISTORY  OB History    Para Term  AB Living   1             SAB IAB Ectopic Multiple Live Births                  # Outcome Date GA Lbr Mars/2nd Weight Sex Type Anes PTL Lv   1 Current                Allergies:  Not on File NKDA   Current Meds:  Current Outpatient Medications   Medication Sig Dispense Refill    calcium carbonate 500 MG Oral Chew Tab Chew 1 tablet (500 mg total) by mouth as needed for Heartburn.      PRENATAL MULTIVIT-MIN-FE-FA OR Take by mouth As Directed.      aspirin 81 MG Oral Tab EC Take 1 tablet (81 mg total) by mouth daily. (Patient not taking: Reported on 10/9/2024)          HISTORY:  No past medical history on file. History STI, obesity   No past surgical history on file.   No family history  on file.   Social History     Socioeconomic History    Marital status: Single     Social Drivers of Health      Received from [facility]    Housing Stability          PHYSICAL EXAMINATION:  /83 (BP Location: Right arm, Patient Position: Sitting, Cuff Size: adult)   Pulse 71   Ht 5' 6\" (1.676 m)   Wt 206 lb (93.4 kg)   BMI 33.25 kg/m²   Physical Exam  Constitutional:       Appearance: Normal appearance.   Abdominal:      Palpations: Abdomen is soft.      Tenderness: There is no abdominal tenderness.   Neurological:      Mental Status: She is alert.   Psychiatric:         Mood and Affect: Mood normal.         Behavior: Behavior normal.           OBSTETRIC ULTRASOUND    Ultrasound Findings:  Single IUP in breech presentation.    Placenta is posterior.   Cardiac activity is present at 153 bpm  EFW 2593 g ( 5 lb 11 oz); 16%.    SUE is  7 cm.  MVP is 3.9 cm  BPP is 8/8.   normal umbilical Doppler  The fetal measurements are consistent with established EDC. No gross ultrasound evidence of structural abnormalities are seen today. The patient understands that ultrasound cannot rule out all structural and chromosomal abnormalities.     See PACS/Imaging Tab For Complete Ultrasound Report  I interpreted the results and reviewed them with the patient.    DISCUSSION  During her visit we discussed and reviewed the following issues:  OBESITY:  Her BMI prior to pregnancy was 31 Please see previous [facility] detailed discussion.     Prevention of Preeclampsia   Please see previous [facility] detailed discussion.         Marijuana (cannabis) is the most common illicit substance used during pregnancy.  Self-reported rates of use vary from 2 to 5 percent in many studies to approximately 30 percent among young, urban, and socioeconomically disadvantaged women. Of women who use marijuana, approximately 50 percent continue use during pregnancy.  Chemical products from marijuana use are transferred across the placenta and  into breast milk. In rat models, fetal plasma levels were approximately 10 percent of maternal levels after acute exposure to delta-9-tetrahydrocannabinol (THC), the primary psychoactive cannabinoid. However, repetitive exposure of THC resulted in higher fetal levels.  While there is no high-quality evidence to suggest an increase in congenital anomalies among marijuana users.  Mixed results have also been reported regarding pregnancy outcomes such as low birth weight,  birth, and stillbirth in marijuana users.  It is unclear whether prenatal marijuana exposure affects neurodevelopmental outcome.  Studies suggest that prenatal marijuana exposure does not affect global intelligence, but may impair sustained attention, visual memory, and analysis and integration in exposed preadolescents and adolescents.    FETAL GROWTH RESTRICTION    DISCUSSION    Background  Fetal growth restriction (FGR) is the final manifestation of a variety of maternal, fetal, and placental conditions. Fetal growth restriction occurs in up to 10% of pregnancies and is second to premature birth as a cause of infant morbidity and mortality. In addition to its significant  impact, FGR also has an impact on long-term health outcomes. Fetal growth restriction remains a complex obstetric problem with disparate published diagnostic criteria, poor detection rates, and limited preventative and treatment options.     Definition  FGR is defined as a sonographic estimated fetal weight (EFW) or abdominal circumference (AC) below the 10th percentile for gestational age.    Classification  Timing  Early onset: < 32 weeks  Late-onset: >= 32 weeks    Severity  Severe: EFW<3%    Please see previous [facility] detailed discussion.       Category  FGR with DECREASED  EDV on UA Dopplers (>95% S/D, RI, PI)  Elevated UA doppler only occurred one time.  Therefore, it is reasonable for her to have delivery 37-38 weeks.  UA Dopplers have been very normal since  the elevated one and are not borderline. Timing of delivery discussed with patient.  Plan delivery 37-38 weeks.  Signs and symptoms of preeclampsia were reviewed.   IMPRESSION:  IUP at  37w0d   Obesity  Fetal growth restriction AC <1%  Elevated UA Doppler--normal again today.    RECOMMENDATIONS:  Continue care with Dr. Luu  Weekly NST's   Delivery advised at 37-38 weeks. Induction scheduled night of 10/29/24  11-20 lb weight gain for pregnancy   Low dose aspirin 162 mg daily      Thank you for allowing me to participate in the care of your patient.  Please do not hesitate to contact me if additional questions or concerns arise.      Sanjuana Zapata M.D.    20  minutes spent in review of records, patient consultation, documentation and coordination of care.  The relevant clinical matter(s) are summarized above.     Note to patient and family  The 21st Century Cures Act makes medical notes available to patients in the interest of transparency.  However, please be advised that this is a medical document.  It is intended as [id number] communication.  It is written and medical language may contain abbreviations or verbiage that are technical and unfamiliar.  It may appear blunt or direct.  Medical documents are intended to carry relevant information, facts as evident, and the clinical opinion of the practitioner.    Pt here for growth ultrasound/BPP/UA Dopplers  + FM  Pt states feeling occas [place] [facility] ctx, denies vag bleeding and leaking fluid.

## (undated) NOTE — LETTER
2024      Shobha Luu, DO  [address]  [address]  [place] [address]  Via Outside Provider Messaging  Patient: Elyssa Saldivar  : 1995    Dear Colleague:  Thank you for referring your patient to me for a Maternal Fetal Medicine evaluation. Please see my attached note for my findings and recommendations.      Should you have any questions or concerns, please do not hesitate to contact me at the number listed below.    Best Regards,      Sanjuana Zapata MD  [facility]   [address]   [facility] [address]  [phone]    cc: No Recipients      [facility] Department of Maternal Fetal Medicine  Patient Name: Elyssa Saldivar  Patient : 1995  Physician: Sanjuana Zapata MD    Outpatient Maternal-Fetal Medicine Consultation    Dear Dr. Luu    Thank you for requesting ultrasound evaluation and maternal fetal medicine consultation on your patient Elyssa Saldivar.  As you are aware she is a 29 year old female  with a vo pregnancy and an Estimated Date of Delivery: 24.  A maternal-fetal medicine consultation was requested secondary to Obesity.  Her prenatal records and labs were reviewed.    No longer using marijuana.  Stopped at 10 weeks.  Only gained 3-4 pounds.  ROS    HISTORY  OB History    Para Term  AB Living   1             SAB IAB Ectopic Multiple Live Births                  # Outcome Date GA Lbr Mars/2nd Weight Sex Type Anes PTL Lv   1 Current                Allergies:  Not on File NKDA   Current Meds:  Current Outpatient Medications   Medication Sig Dispense Refill    PRENATAL MULTIVIT-MIN-FE-FA OR Take by mouth As Directed.          HISTORY:  No past medical history on file. History STI, obesity   No past surgical history on file.   No family history on file.   Social History     Socioeconomic History    Marital status: Single     Social Determinants of Health      Received from [facility]    Housing Stability          PHYSICAL EXAMINATION:  /82 (BP Location: Right arm, Patient Position: Sitting, Cuff Size: adult)   Pulse 106   Ht 5' 6\" (1.676 m)   Wt 197 lb (89.4 kg)   BMI 31.80 kg/m²   Physical Exam  Constitutional:       Appearance: Normal appearance.   Abdominal:      Palpations: Abdomen is soft.      Tenderness: There is no abdominal tenderness.   Neurological:      Mental Status: She is alert.   Psychiatric:         Mood and Affect: Mood normal.         Behavior: Behavior normal.         OBSTETRIC ULTRASOUND  The patient had a level II ultrasound today which revealed size consistent with dates and a normal detailed anatomic survey.  Ultrasound Findings:  Single IUP in breech presentation.    Placenta is posterior.   A 3 vessel cord is noted.  Cardiac activity is present at 161 bpm   g ( 1 lb 0 oz);   MVP is 4.9 cm .     The fetal measurements are consistent with the established EDC. No ultrasound evidence of structural abnormalities are seen today. The nasal bone is present. No ultrasound evidence of markers for aneuploidy are seen. She understands that ultrasound exam cannot exclude genetic abnormalities and that genetic testing is recommended. The limitations of ultrasound were discussed.     Uterus and adnexa appeared normal  today on US  See PACS/Imaging Tab For Complete Ultrasound Report  I interpreted the results and reviewed them with the patient.    DISCUSSION  During her visit we discussed and reviewed the following issues:  OBESITY:  Her BMI prior to pregnancy was 31  Obesity during pregnancy is associated with numerous maternal and  risks.  It is not clear whether obesity is a direct cause of adverse pregnancy outcome or whether the association between obesity and adverse pregnancy outcome is due to factors such as diabetes mellitus.   Data suggest that obese women should be encouraged to undertake a weight reduction program (diet, exercise, behavior  modification, and possibly bariatric surgery in some cases) prior to attempting to conceive.            Subfertility in obese women is most commonly related to ovulatory dysfunction, and, in some obese women, the ovulatory dysfunction is related to polycystic ovary syndrome (PCOS). It is also important to note that even among ovulatory women, increasing obesity is associated with decreasing spontaneous pregnancy rates.  The increased risk of miscarriage in obese women may be because such women often have PCOS or isolated insulin resistance.                 Due to its strong association with obesity in the general population, type 2 diabetes mellitus is one of the two most common medical complications of the obese . The increased risk of type 2 diabetes is primarily related to an exaggerated increase in insulin resistance in the obese state. It is reasonable to screen obese gravidas for undiagnosed pregestational diabetes in the first trimester.   Glucose intolerance associated with gestational diabetes generally resolves postpartum; however, obese women with a history of gestational diabetes have a two-fold increased prevalence of subsequent type 2 diabetes.           An association between obesity and hypertensive disorders during pregnancy has been consistently reported.  In particular, maternal weight and BMI are independent risk factors for preeclampsia.             Studies have found that the increased risk of  birth in obese gravidas is primarily associated with obesity-related medical and  complications, rather than an intrinsic predisposition to spontaneous  birth. Prevention of  birth in these patients, therefore, should be directed toward prevention or management of medical and obstetrical complications.               Both prepregnancy obesity and excessive maternal weight gain before or during pregnancy contribute to an increased probability of  delivery.  It  has also been hypothesized that obesity may lead to dystocia due to increased soft tissue deposition in the maternal pelvis.    delivery in the obese  is associated with numerous perioperative concerns, including emergency delivery, prolonged incision to delivery interval, blood loss >1000 mL, longer operative times, wound infection, thromboembolism, and endometritis.            Maternal obesity appears to be associated with a small increase in the absolute rate of some congenital anomalies (primarily neural tube defect and cardiac), and the risk may increase with increasing maternal weight.  Level II ultrasound is advised for women with obesity.  The risk of neural tube defects increased significantly with maternal weight.    The analysis found that overweight and obese pregnant women experienced significantly more stillbirths than normal weight women.  Third trimester  testing is advised.     Prevention of Preeclampsia    Antiplatelet Agents  The observation that preeclampsia is associated with increased platelet turnover and increased platelet-derived thromboxane levels led to randomized trials evaluating low-dose aspirin therapy in women thought to be at increased risk of the disease. As opposed to higher dose aspirin therapy, low-dose aspirin (60 to 150 mg per day) diminishes platelet thromboxane synthesis while maintaining vascular wall prostacyclin synthesis. Although not well studied, the beneficial effect of low-dose aspirin for prevention of preeclampsia may also be in part related to modulation of inflammation. The drug has been studied for both prevention of preeclampsia and prevention of progression from mild to severe disease. It appears to result in a modest reduction in risk of preeclampsia when given to women at moderate to high risk of preeclampsia.  This approach has been studied in over 35,000 women, for both prevention of preeclampsia and prevention of progression of the  disease. Low dose aspirin has a modest impact on pregnancy outcome; it reduces the risk of preeclampsia, as well as other adverse pregnancy outcomes (eg,  delivery, fetal growth restriction) by about 10 to 20 percent. Low dose aspirin is safe in pregnancy; thus, it is a reasonable strategy in women with a moderate to high risk of preeclampsia in whom the benefits outweigh the risks.     Clinical Guidelines  Based on the available data, low-dose aspirin is advised for women at high risk for preeclampsia. There is no consensus on the criteria that confer high risk. The [place] Preventive Services Task Force (USPSTF) risk criteria are reasonable.  USPSTF criteria for high risk include one or more of the following:   Previous pregnancy with preeclampsia, especially early onset and with an adverse outcome   Multifetal gestation  Chronic hypertension   Type 1 or 2 diabetes mellitus  Chronic kidney disease  Autoimmune disease (antiphospholipid syndrome, systemic lupus erythematosus)     Women with multiple moderate risk factors for preeclampsia are considered high risk, as well. Identification of women with an appropriate combination of moderate risk factors to be considered high risk is subjective and determined case by case, as the data describing the magnitude of the association between each of these risk factors and development of preeclampsia vary widely and lack consistency. USPSTF criteria for moderate risk include:  Nulliparity  Obesity (body mass index >30 kg/m2)  Family history of preeclampsia in mother or sister  Age ?35 years  Sociodemographic characteristics (, low socioeconomic level)  Personal risk factors (eg, history of low birth weight or small for gestational age, previous adverse pregnancy outcome, >10 year pregnancy interval)     If used, daily low-dose aspirin should be initiated in the 12th or 13th week of gestation, although adverse effects from earlier initiation (eg,  preconception) have not been documented. The optimum low dose is unclear; 81 mg is readily available, but 100 or 150 mg (which are not available in some countries including the [place] may be more effective.  In some pregnant women, platelet function is not inhibited by the 81 mg dose but is altered by higher dosing. Hence, current evidence has suggested a daily dose of 100 to 150 mg of aspirin rather than a lower dose. In the [place], this can be achieved by taking one and one-half 81 mg tablets; however, taking one 81 mg tablet is also reasonable since this is the commercially available dose and has proven efficacy. For prevention of preeclampsia, no trials have evaluated the efficacy of a higher dose of aspirin, which could be achieved easily by taking two 81 mg tablets or splitting a 325 mg tablet. For prevention of myocardial infarction and stroke in nonpregnant individuals, aspirin appears to be equally effective at doses between 75 and 325 mg per day.  The safety of low-dose aspirin use in the second and third trimesters is well established, but questions linger regarding use in the first trimester (possible increase in minor vaginal bleeding or gastroschisis). Early therapy (before 16 weeks) may be important since the pathophysiologic features of preeclampsia develop at this time, weeks before clinical disease is apparent. However, available evidence is inconsistent about the importance of early initiation of therapy, possibly because aspirin has major effects on prostacyclin production and endothelial function throughout gestation.  Aspirin is discontinued 5 to 10 days before expected delivery to diminish the risk of bleeding during delivery; however, no adverse maternal or fetal effects related to low-dose aspirin have been proven.  Major questions remain as to which, if any, subgroups of women are more likely to benefit from low-dose aspirin therapy, when treatment should be started (first or  second trimester), and the optimum dose to inhibit placental PGH synthase (cyclooxygenase) and also allow the anti-inflammatory effects of low-dose aspirin.        Marijuana (cannabis) is the most common illicit substance used during pregnancy.  Self-reported rates of use vary from 2 to 5 percent in many studies to approximately 30 percent among young, urban, and socioeconomically disadvantaged women. Of women who use marijuana, approximately 50 percent continue use during pregnancy.  Chemical products from marijuana use are transferred across the placenta and into breast milk. In rat models, fetal plasma levels were approximately 10 percent of maternal levels after acute exposure to delta-9-tetrahydrocannabinol (THC), the primary psychoactive cannabinoid. However, repetitive exposure of THC resulted in higher fetal levels.  While there is no high-quality evidence to suggest an increase in congenital anomalies among marijuana users.  Mixed results have also been reported regarding pregnancy outcomes such as low birth weight,  birth, and stillbirth in marijuana users.  It is unclear whether prenatal marijuana exposure affects neurodevelopmental outcome.  Studies suggest that prenatal marijuana exposure does not affect global intelligence, but may impair sustained attention, visual memory, and analysis and integration in exposed preadolescents and adolescents.    IMPRESSION:  IUP at 22w0d  Obesity    RECOMMENDATIONS:  Continue care with Dr. Luu  Growth US & BPP at 32 weeks  Weekly NSTs at 36 weeks  11-20 lb weight gain for pregnancy   Low dose aspirin 162 mg daily      Thank you for allowing me to participate in the care of your patient.  Please do not hesitate to contact me if additional questions or concerns arise.      Sanjuana Zapata M.D.    40 minutes spent in review of records, patient consultation, documentation and coordination of care.  The relevant clinical matter(s) are summarized above.     Note  to patient and family  The 21st Century Cures Act makes medical notes available to patients in the interest of transparency.  However, please be advised that this is a medical document.  It is intended as avzo-rk-svsu communication.  It is written and medical language may contain abbreviations or verbiage that are technical and unfamiliar.  It may appear blunt or direct.  Medical documents are intended to carry relevant information, facts as evident, and the clinical opinion of the practitioner.    Pt here for level II ultrasound  Pt reports + FM  Pt denies any complaints

## (undated) NOTE — LETTER
2024      Shobha Luu, DO  [address]  [facility] [address]  Via Outside Provider Messaging  Patient: Elyssa Saldivar  : 1995    Dear Colleague:  Thank you for referring your patient to me for a Maternal Fetal Medicine evaluation. Please see my attached note for my findings and recommendations.      Should you have any questions or concerns, please do not hesitate to contact me at the number listed below.    Best Regards,      Sanjuana Zapata MD  [facility]   [address]  [facility] [address]  [phone]    cc: No Recipients      [facility] Department of Maternal Fetal Medicine  Patient Name: Elyssa Saldivar  Patient : 1995  Physician: Sanjuana Zapata MD    Pt here for UA Dopplers/BPP  + FM  No complaints    Outpatient Maternal-Fetal Medicine Consultation    Dear Dr. Luu    Thank you for requesting ultrasound evaluation and maternal fetal medicine consultation on your patient Elyssa Saldivar.  As you are aware she is a 29 year old female  with a vo pregnancy and an Estimated Date of Delivery: 24.  A maternal-fetal medicine f/u is today.   Her prenatal records and labs were reviewed.    No new changes.  ROS    HISTORY  OB History    Para Term  AB Living   1             SAB IAB Ectopic Multiple Live Births                  # Outcome Date GA Lbr Mars/2nd Weight Sex Type Anes PTL Lv   1 Current                Allergies:  Not on File NKDA   Current Meds:  Current Outpatient Medications   Medication Sig Dispense Refill    aspirin 81 MG Oral Tab EC Take 1 tablet (81 mg total) by mouth daily.      PRENATAL MULTIVIT-MIN-FE-FA OR Take by mouth As Directed.      calcium carbonate 500 MG Oral Chew Tab Chew 1 tablet (500 mg total) by mouth as needed for Heartburn.          HISTORY:  No past medical history on file. History STI, obesity   No past surgical history on file.   No family history on file.    Social History     Socioeconomic History    Marital status: Single     Social Determinants of Health      Received from [facility]    Housing Stability          PHYSICAL EXAMINATION:  /79 (BP Location: Right arm, Patient Position: Sitting, Cuff Size: adult)   Pulse 85   Ht 5' 6\" (1.676 m)   Wt 207 lb (93.9 kg)   BMI 33.41 kg/m²   Physical Exam  Constitutional:       Appearance: Normal appearance.   Abdominal:      Palpations: Abdomen is soft.      Tenderness: There is no abdominal tenderness.   Neurological:      Mental Status: She is alert.   Psychiatric:         Mood and Affect: Mood normal.         Behavior: Behavior normal.           OBSTETRIC ULTRASOUND  Ultrasound Findings:  Single IUP in breech presentation.    Placenta is posterior.   A 3 vessel cord is noted.  Cardiac activity is present at 149 bpm  MVP is 3.5 cm . SUE 8.5 cm  BPP is 8/8.   Umbilical Artery Doppler is 2.66.       See PACS/Imaging Tab For Complete Ultrasound Report  I interpreted the results and reviewed them with the patient.    DISCUSSION  During her visit we discussed and reviewed the following issues:  OBESITY:  Her BMI prior to pregnancy was 31 Please see previous [facility] detailed discussion.     Prevention of Preeclampsia   Please see previous [facility] detailed discussion.         Marijuana (cannabis) is the most common illicit substance used during pregnancy.  Self-reported rates of use vary from 2 to 5 percent in many studies to approximately 30 percent among young, urban, and socioeconomically disadvantaged women. Of women who use marijuana, approximately 50 percent continue use during pregnancy.  Chemical products from marijuana use are transferred across the placenta and into breast milk. In rat models, fetal plasma levels were approximately 10 percent of maternal levels after acute exposure to delta-9-tetrahydrocannabinol (THC), the primary psychoactive cannabinoid. However, repetitive exposure of THC  resulted in higher fetal levels.  While there is no high-quality evidence to suggest an increase in congenital anomalies among marijuana users.  Mixed results have also been reported regarding pregnancy outcomes such as low birth weight,  birth, and stillbirth in marijuana users.  It is unclear whether prenatal marijuana exposure affects neurodevelopmental outcome.  Studies suggest that prenatal marijuana exposure does not affect global intelligence, but may impair sustained attention, visual memory, and analysis and integration in exposed preadolescents and adolescents.    FETAL GROWTH RESTRICTION    DISCUSSION    Background  Fetal growth restriction (FGR) is the final manifestation of a variety of maternal, fetal, and placental conditions. Fetal growth restriction occurs in up to 10% of pregnancies and is second to premature birth as a cause of infant morbidity and mortality. In addition to its significant  impact, FGR also has an impact on long-term health outcomes. Fetal growth restriction remains a complex obstetric problem with disparate published diagnostic criteria, poor detection rates, and limited preventative and treatment options.     Definition  FGR is defined as a sonographic estimated fetal weight (EFW) or abdominal circumference (AC) below the 10th percentile for gestational age.    Classification  Timing  Early onset: < 32 weeks  Late-onset: >= 32 weeks    Severity  Severe: EFW<3%    Please see previous MFM detailed discussion.       Category  FGR with DECREASED  EDV on UA Dopplers (>95% S/D, RI, PI)    Signs and symptoms of preeclampsia were reviewed.   IMPRESSION:  IUP at 33w0d   Obesity  Fetal growth restriction AC 3%  Elevated UA Doppler--normal today.    RECOMMENDATIONS:  Continue care with Dr. Luu  Weekly NST's   Repeat UA Dopplers & AFV weekly  Repeat growth ultrasound with UA Dopplers q 2 weeks  Delivery advised at 37 weeks if Dopplers remain the same. Today UA Doppler  normal.  11-20 lb weight gain for pregnancy   Low dose aspirin 162 mg daily      Thank you for allowing me to participate in the care of your patient.  Please do not hesitate to contact me if additional questions or concerns arise.      Sanjuana Zapata M.D.    20  minutes spent in review of records, patient consultation, documentation and coordination of care.  The relevant clinical matter(s) are summarized above.     Note to patient and family  The 21st Century Cures Act makes medical notes available to patients in the interest of transparency.  However, please be advised that this is a medical document.  It is intended as [id number] communication.  It is written and medical language may contain abbreviations or verbiage that are technical and unfamiliar.  It may appear blunt or direct.  Medical documents are intended to carry relevant information, facts as evident, and the clinical opinion of the practitioner.

## (undated) NOTE — LETTER
2024      Shobha Luu, DO  [address]  [facility] [address]  Via Outside Provider Messaging  Patient: Elyssa Saldivar  : 1995    Dear Colleague:  Thank you for referring your patient to me for a Maternal Fetal Medicine evaluation. Please see my attached note for my findings and recommendations.      Should you have any questions or concerns, please do not hesitate to contact me at the number listed below.    Best Regards,      Sanjuana Zapata MD  [facility]   [address]  [facility] [address]  [phone]    cc: No Recipients      [facility] Department of Maternal Fetal Medicine  Patient Name: Elyssa Saldivar  Patient : 1995  Physician: Sanjuana Zapata MD    Pt here for Growth Ultrasound  +fm noted per patient  Pt noted swelling bilat hands past 2 days.   Pt denies further complaints     Outpatient Maternal-Fetal Medicine Consultation    Dear Dr. Luu    Thank you for requesting ultrasound evaluation and maternal fetal medicine consultation on your patient Elyssa Saldivar.  As you are aware she is a 29 year old female  with a vo pregnancy and an Estimated Date of Delivery: 24.  A maternal-fetal medicine f/u is today.   Her prenatal records and labs were reviewed.    No new changes.  38 weeks falls on 10/30/24. She is not sure she wants to delivery on 10/31/24, but understands that her baby could come deliver on any upcoming day..  Her spouse is comfortable with delivery on 10/31/24 should it happen.  ROS    HISTORY  OB History    Para Term  AB Living   1             SAB IAB Ectopic Multiple Live Births                  # Outcome Date GA Lbr Mars/2nd Weight Sex Type Anes PTL Lv   1 Current                Allergies:  Not on File NKDA   Current Meds:  Current Outpatient Medications   Medication Sig Dispense Refill   • calcium carbonate 500 MG Oral Chew Tab Chew 1 tablet (500 mg total) by mouth as  needed for Heartburn.     • PRENATAL MULTIVIT-MIN-FE-FA OR Take by mouth As Directed.     • aspirin 81 MG Oral Tab EC Take 1 tablet (81 mg total) by mouth daily. (Patient not taking: Reported on 10/9/2024)          HISTORY:  No past medical history on file. History STI, obesity   No past surgical history on file.   No family history on file.   Social History     Socioeconomic History   • Marital status: Single     Social Drivers of Health      Received from [facility]    Housing Stability          PHYSICAL EXAMINATION:  /71 (BP Location: Right arm, Patient Position: Sitting, Cuff Size: large)   Pulse 84   Ht 5' 6\" (1.676 m)   Wt 210 lb (95.3 kg)   BMI 33.89 kg/m²   Physical Exam  Constitutional:       Appearance: Normal appearance.   Abdominal:      Palpations: Abdomen is soft.      Tenderness: There is no abdominal tenderness.   Neurological:      Mental Status: She is alert.   Psychiatric:         Mood and Affect: Mood normal.         Behavior: Behavior normal.           OBSTETRIC ULTRASOUND  Ultrasound Findings:  Single IUP in breech presentation.    Placenta is posterior.   A 3 vessel cord is noted.  Cardiac activity is present at 157 bpm  EFW 2103 g ( 4 lb 10 oz); 15%.  AC < 1%  SUE is  7.5 cm.  MVP is 4.5 cm  BPP is 8/8.   UA Doppler is normal.  The fetal measurements are consistent with with suspected fetal growth restriction. No gross ultrasound evidence of structural abnormalities are seen today. The patient understands that ultrasound cannot rule out all structural and chromosomal abnormalities.       See PACS/Imaging Tab For Complete Ultrasound Report  I interpreted the results and reviewed them with the patient.    DISCUSSION  During her visit we discussed and reviewed the following issues:  OBESITY:  Her BMI prior to pregnancy was 31 Please see previous [facility] detailed discussion.     Prevention of Preeclampsia   Please see previous [facility] detailed discussion.         Marijuana  (cannabis) is the most common illicit substance used during pregnancy.  Self-reported rates of use vary from 2 to 5 percent in many studies to approximately 30 percent among young, urban, and socioeconomically disadvantaged women. Of women who use marijuana, approximately 50 percent continue use during pregnancy.  Chemical products from marijuana use are transferred across the placenta and into breast milk. In rat models, fetal plasma levels were approximately 10 percent of maternal levels after acute exposure to delta-9-tetrahydrocannabinol (THC), the primary psychoactive cannabinoid. However, repetitive exposure of THC resulted in higher fetal levels.  While there is no high-quality evidence to suggest an increase in congenital anomalies among marijuana users.  Mixed results have also been reported regarding pregnancy outcomes such as low birth weight,  birth, and stillbirth in marijuana users.  It is unclear whether prenatal marijuana exposure affects neurodevelopmental outcome.  Studies suggest that prenatal marijuana exposure does not affect global intelligence, but may impair sustained attention, visual memory, and analysis and integration in exposed preadolescents and adolescents.    FETAL GROWTH RESTRICTION    DISCUSSION    Background  Fetal growth restriction (FGR) is the final manifestation of a variety of maternal, fetal, and placental conditions. Fetal growth restriction occurs in up to 10% of pregnancies and is second to premature birth as a cause of infant morbidity and mortality. In addition to its significant  impact, FGR also has an impact on long-term health outcomes. Fetal growth restriction remains a complex obstetric problem with disparate published diagnostic criteria, poor detection rates, and limited preventative and treatment options.     Definition  FGR is defined as a sonographic estimated fetal weight (EFW) or abdominal circumference (AC) below the 10th percentile for  gestational age.    Classification  Timing  Early onset: < 32 weeks  Late-onset: >= 32 weeks    Severity  Severe: EFW<3%    Please see previous MFM detailed discussion.       Category  FGR with DECREASED  EDV on UA Dopplers (>95% S/D, RI, PI)  Elevated UA doppler only occurred one time.  Therefore, it is reasonable for her to have delivery 37-38 weeks.  UA Dopplers have been very normal since the elevated one and are not borderline. Timing of delivery discussed with patient.  Plan delivery 37-38 weeks.  Signs and symptoms of preeclampsia were reviewed.   IMPRESSION:  IUP at 33w0d   Obesity  Fetal growth restriction AC <1%  Elevated UA Doppler--normal again today.    RECOMMENDATIONS:  Continue care with Dr. Luu  Weekly NST's   Repeat UA Dopplers & AFV weekly  Repeat growth ultrasound with UA Dopplers q 2 weeks  Delivery advised at 37-38 weeks.  11-20 lb weight gain for pregnancy   Low dose aspirin 162 mg daily      Thank you for allowing me to participate in the care of your patient.  Please do not hesitate to contact me if additional questions or concerns arise.      Sanjuana Zapata M.D.    20  minutes spent in review of records, patient consultation, documentation and coordination of care.  The relevant clinical matter(s) are summarized above.     Note to patient and family  The 21st Century Cures Act makes medical notes available to patients in the interest of transparency.  However, please be advised that this is a medical document.  It is intended as [id number] communication.  It is written and medical language may contain abbreviations or verbiage that are technical and unfamiliar.  It may appear blunt or direct.  Medical documents are intended to carry relevant information, facts as evident, and the clinical opinion of the practitioner.